# Patient Record
Sex: FEMALE | Race: AMERICAN INDIAN OR ALASKA NATIVE | HISPANIC OR LATINO
[De-identification: names, ages, dates, MRNs, and addresses within clinical notes are randomized per-mention and may not be internally consistent; named-entity substitution may affect disease eponyms.]

---

## 2017-06-13 ENCOUNTER — APPOINTMENT (OUTPATIENT)
Dept: INFECTIOUS DISEASE | Facility: CLINIC | Age: 42
End: 2017-06-13

## 2017-06-13 ENCOUNTER — LABORATORY RESULT (OUTPATIENT)
Age: 42
End: 2017-06-13

## 2017-06-13 ENCOUNTER — OUTPATIENT (OUTPATIENT)
Dept: OUTPATIENT SERVICES | Facility: HOSPITAL | Age: 42
LOS: 1 days | End: 2017-06-13
Payer: COMMERCIAL

## 2017-06-13 VITALS
BODY MASS INDEX: 23.39 KG/M2 | WEIGHT: 137 LBS | HEIGHT: 64 IN | RESPIRATION RATE: 16 BRPM | DIASTOLIC BLOOD PRESSURE: 68 MMHG | OXYGEN SATURATION: 100 % | HEART RATE: 82 BPM | SYSTOLIC BLOOD PRESSURE: 96 MMHG | TEMPERATURE: 97.3 F

## 2017-06-13 DIAGNOSIS — B20 HUMAN IMMUNODEFICIENCY VIRUS [HIV] DISEASE: ICD-10-CM

## 2017-06-13 PROCEDURE — 93010 ELECTROCARDIOGRAM REPORT: CPT | Mod: NC

## 2017-06-13 PROCEDURE — G0463: CPT

## 2017-06-13 PROCEDURE — 93005 ELECTROCARDIOGRAM TRACING: CPT

## 2017-06-14 LAB
ALBUMIN SERPL ELPH-MCNC: 4.1 G/DL
ALP BLD-CCNC: 65 U/L
ALT SERPL-CCNC: 6 U/L
ANION GAP SERPL CALC-SCNC: 9 MMOL/L
APPEARANCE: CLEAR
AST SERPL-CCNC: 16 U/L
BACTERIA: NEGATIVE
BASOPHILS # BLD AUTO: 0.02 K/UL
BASOPHILS NFR BLD AUTO: 0.3 %
BILIRUB SERPL-MCNC: 0.3 MG/DL
BILIRUBIN URINE: NEGATIVE
BLOOD URINE: NEGATIVE
BUN SERPL-MCNC: 11 MG/DL
C TRACH RRNA SPEC QL NAA+PROBE: NORMAL
CALCIUM SERPL-MCNC: 9.4 MG/DL
CD3 CELLS # BLD: 1157 /UL
CD3 CELLS NFR BLD: 73 %
CD3+CD4+ CELLS # BLD: 616 /UL
CD3+CD4+ CELLS NFR BLD: 39 %
CD3+CD4+ CELLS/CD3+CD8+ CLL SPEC: 1.15 RATIO
CD3+CD8+ CELLS # SPEC: 534 /UL
CD3+CD8+ CELLS NFR BLD: 34 %
CHLORIDE SERPL-SCNC: 105 MMOL/L
CO2 SERPL-SCNC: 24 MMOL/L
COLOR: YELLOW
CREAT SERPL-MCNC: 0.71 MG/DL
EOSINOPHIL # BLD AUTO: 0.09 K/UL
EOSINOPHIL NFR BLD AUTO: 1.5 %
GLUCOSE QUALITATIVE U: NORMAL MG/DL
GLUCOSE SERPL-MCNC: 90 MG/DL
HAV IGG+IGM SER QL: REACTIVE
HBV CORE IGG+IGM SER QL: NONREACTIVE
HBV SURFACE AB SER QL: REACTIVE
HBV SURFACE AG SER QL: NONREACTIVE
HCG SERPL-MCNC: <1 MIU/ML
HCT VFR BLD CALC: 38 %
HGB BLD-MCNC: 12.3 G/DL
HIV1 RNA # SERPL NAA+PROBE: NORMAL
HIV1 RNA # SERPL NAA+PROBE: NOT DETECTED COPIES/ML
HIV1+2 AB SPEC QL IA.RAPID: REACTIVE
HYALINE CASTS: 1 /LPF
IMM GRANULOCYTES NFR BLD AUTO: 0 %
KETONES URINE: NEGATIVE
LEUKOCYTE ESTERASE URINE: NEGATIVE
LYMPHOCYTES # BLD AUTO: 1.49 K/UL
LYMPHOCYTES NFR BLD AUTO: 25.6 %
MAN DIFF?: NORMAL
MCHC RBC-ENTMCNC: 31.5 PG
MCHC RBC-ENTMCNC: 32.4 GM/DL
MCV RBC AUTO: 97.2 FL
MICROSCOPIC-UA: NORMAL
MONOCYTES # BLD AUTO: 0.36 K/UL
MONOCYTES NFR BLD AUTO: 6.2 %
N GONORRHOEA RRNA SPEC QL NAA+PROBE: NORMAL
NEUTROPHILS # BLD AUTO: 3.85 K/UL
NEUTROPHILS NFR BLD AUTO: 66.4 %
NITRITE URINE: NEGATIVE
PH URINE: 6
PLATELET # BLD AUTO: 331 K/UL
POTASSIUM SERPL-SCNC: 4.5 MMOL/L
PROT SERPL-MCNC: 8.2 G/DL
PROTEIN URINE: NEGATIVE MG/DL
RBC # BLD: 3.91 M/UL
RBC # FLD: 13.8 %
RED BLOOD CELLS URINE: 0 /HPF
SODIUM SERPL-SCNC: 138 MMOL/L
SOURCE AMPLIFICATION: NORMAL
SPECIFIC GRAVITY URINE: 1.02
SQUAMOUS EPITHELIAL CELLS: 1 /HPF
T PALLIDUM AB SER QL IA: NEGATIVE
UROBILINOGEN URINE: NORMAL MG/DL
VIRAL LOAD INTERP: NORMAL
VIRAL LOAD LOG: NOT DETECTED LG COP/ML
WBC # FLD AUTO: 5.81 K/UL
WHITE BLOOD CELLS URINE: 1 /HPF

## 2017-06-15 DIAGNOSIS — A60.00 HERPESVIRAL INFECTION OF UROGENITAL SYSTEM, UNSPECIFIED: ICD-10-CM

## 2017-06-15 LAB
HCV RNA SERPL NAA DL=5-ACNC: NOT DETECTED IU/ML
HCV RNA SERPL NAA+PROBE-LOG IU: NOT DETECTED LOGIU/ML

## 2017-06-16 LAB
ADJUSTED MITOGEN: 3.33 IU/ML
ADJUSTED TB AG: 1.16 IU/ML
M TB IFN-G BLD-IMP: ABNORMAL
QUANTIFERON GOLD NIL: 8.35 IU/ML

## 2017-07-13 ENCOUNTER — EMERGENCY (EMERGENCY)
Facility: HOSPITAL | Age: 42
LOS: 1 days | Discharge: HOME | End: 2017-07-13
Attending: EMERGENCY MEDICINE | Admitting: EMERGENCY MEDICINE
Payer: COMMERCIAL

## 2017-07-13 VITALS
HEART RATE: 70 BPM | TEMPERATURE: 98 F | OXYGEN SATURATION: 100 % | RESPIRATION RATE: 19 BRPM | SYSTOLIC BLOOD PRESSURE: 91 MMHG | DIASTOLIC BLOOD PRESSURE: 59 MMHG

## 2017-07-13 VITALS
DIASTOLIC BLOOD PRESSURE: 72 MMHG | TEMPERATURE: 98 F | OXYGEN SATURATION: 98 % | RESPIRATION RATE: 19 BRPM | HEIGHT: 64 IN | WEIGHT: 136.69 LBS | SYSTOLIC BLOOD PRESSURE: 104 MMHG | HEART RATE: 71 BPM

## 2017-07-13 DIAGNOSIS — M54.12 RADICULOPATHY, CERVICAL REGION: ICD-10-CM

## 2017-07-13 DIAGNOSIS — Z91.013 ALLERGY TO SEAFOOD: ICD-10-CM

## 2017-07-13 PROCEDURE — 72125 CT NECK SPINE W/O DYE: CPT

## 2017-07-13 PROCEDURE — 99284 EMERGENCY DEPT VISIT MOD MDM: CPT

## 2017-07-13 PROCEDURE — 72125 CT NECK SPINE W/O DYE: CPT | Mod: 26

## 2017-07-13 RX ORDER — CELECOXIB 200 MG/1
1 CAPSULE ORAL
Qty: 10 | Refills: 0 | OUTPATIENT
Start: 2017-07-13 | End: 2017-07-23

## 2017-07-13 NOTE — ED PROVIDER NOTE - OBJECTIVE STATEMENT
41 yo female with left sided neck pain radiating into left arm for the past few days. No chest pain and no paresthesia. No weakness in extremities.

## 2017-07-13 NOTE — ED PROVIDER NOTE - CONSTITUTIONAL, MLM
normal... Well appearing, Female, well nourished, awake, alert, oriented to person, place, time/situation and in no apparent distress.

## 2017-07-13 NOTE — ED ADULT NURSE NOTE - OBJECTIVE STATEMENT
Present to ER with a complaints of generalized back pain that radiates to left arm. Pt states she has tingling feeling going down her left arm. denies any chest pain of shortness of breath

## 2017-07-18 ENCOUNTER — OUTPATIENT (OUTPATIENT)
Dept: OUTPATIENT SERVICES | Facility: HOSPITAL | Age: 42
LOS: 1 days | End: 2017-07-18
Payer: COMMERCIAL

## 2017-07-18 ENCOUNTER — APPOINTMENT (OUTPATIENT)
Dept: INFECTIOUS DISEASE | Facility: CLINIC | Age: 42
End: 2017-07-18

## 2017-07-18 VITALS
DIASTOLIC BLOOD PRESSURE: 67 MMHG | TEMPERATURE: 97.4 F | HEIGHT: 64 IN | BODY MASS INDEX: 23.39 KG/M2 | OXYGEN SATURATION: 100 % | RESPIRATION RATE: 16 BRPM | HEART RATE: 86 BPM | SYSTOLIC BLOOD PRESSURE: 93 MMHG | WEIGHT: 137 LBS

## 2017-07-18 DIAGNOSIS — J30.2 OTHER SEASONAL ALLERGIC RHINITIS: ICD-10-CM

## 2017-07-18 DIAGNOSIS — M50.20 OTHER CERVICAL DISC DISPLACEMENT, UNSPECIFIED CERVICAL REGION: ICD-10-CM

## 2017-07-18 DIAGNOSIS — B20 HUMAN IMMUNODEFICIENCY VIRUS [HIV] DISEASE: ICD-10-CM

## 2017-07-18 PROCEDURE — G0463: CPT

## 2017-07-26 ENCOUNTER — OUTPATIENT (OUTPATIENT)
Dept: OUTPATIENT SERVICES | Facility: HOSPITAL | Age: 42
LOS: 1 days | End: 2017-07-26
Payer: COMMERCIAL

## 2017-07-26 ENCOUNTER — APPOINTMENT (OUTPATIENT)
Dept: INFECTIOUS DISEASE | Facility: CLINIC | Age: 42
End: 2017-07-26

## 2017-07-26 VITALS
OXYGEN SATURATION: 100 % | HEART RATE: 88 BPM | TEMPERATURE: 98.5 F | SYSTOLIC BLOOD PRESSURE: 93 MMHG | WEIGHT: 137 LBS | HEIGHT: 64 IN | BODY MASS INDEX: 23.39 KG/M2 | DIASTOLIC BLOOD PRESSURE: 68 MMHG | RESPIRATION RATE: 16 BRPM

## 2017-07-26 DIAGNOSIS — B20 HUMAN IMMUNODEFICIENCY VIRUS [HIV] DISEASE: ICD-10-CM

## 2017-07-26 DIAGNOSIS — R51 HEADACHE: ICD-10-CM

## 2017-07-26 PROCEDURE — G0463: CPT

## 2017-07-27 DIAGNOSIS — J02.9 ACUTE PHARYNGITIS, UNSPECIFIED: ICD-10-CM

## 2017-07-31 LAB
ALBUMIN SERPL ELPH-MCNC: 4 G/DL
ALP BLD-CCNC: 62 U/L
ALT SERPL-CCNC: 5 U/L
ANION GAP SERPL CALC-SCNC: 14 MMOL/L
AST SERPL-CCNC: 13 U/L
BACTERIA THROAT CULT: NORMAL
BASOPHILS # BLD AUTO: 0.03 K/UL
BASOPHILS NFR BLD AUTO: 0.3 %
BILIRUB SERPL-MCNC: 0.3 MG/DL
BUN SERPL-MCNC: 13 MG/DL
CALCIUM SERPL-MCNC: 9.4 MG/DL
CHLORIDE SERPL-SCNC: 103 MMOL/L
CO2 SERPL-SCNC: 24 MMOL/L
CREAT SERPL-MCNC: 0.77 MG/DL
EOSINOPHIL # BLD AUTO: 0.04 K/UL
EOSINOPHIL NFR BLD AUTO: 0.4 %
GLUCOSE SERPL-MCNC: 121 MG/DL
HCG SERPL-MCNC: 1 MIU/ML
HCT VFR BLD CALC: 35.8 %
HGB BLD-MCNC: 11.8 G/DL
IMM GRANULOCYTES NFR BLD AUTO: 0.2 %
LYMPHOCYTES # BLD AUTO: 1.02 K/UL
LYMPHOCYTES NFR BLD AUTO: 9.1 %
MAN DIFF?: NORMAL
MCHC RBC-ENTMCNC: 31.1 PG
MCHC RBC-ENTMCNC: 33 GM/DL
MCV RBC AUTO: 94.5 FL
MONOCYTES # BLD AUTO: 0.61 K/UL
MONOCYTES NFR BLD AUTO: 5.5 %
NEUTROPHILS # BLD AUTO: 9.44 K/UL
NEUTROPHILS NFR BLD AUTO: 84.5 %
PLATELET # BLD AUTO: 288 K/UL
POTASSIUM SERPL-SCNC: 4.2 MMOL/L
PROT SERPL-MCNC: 7.1 G/DL
RBC # BLD: 3.79 M/UL
RBC # FLD: 12.9 %
SODIUM SERPL-SCNC: 141 MMOL/L
WBC # FLD AUTO: 11.16 K/UL

## 2017-08-14 ENCOUNTER — OUTPATIENT (OUTPATIENT)
Dept: OUTPATIENT SERVICES | Facility: HOSPITAL | Age: 42
LOS: 1 days | End: 2017-08-14
Payer: COMMERCIAL

## 2017-08-14 ENCOUNTER — APPOINTMENT (OUTPATIENT)
Dept: MAMMOGRAPHY | Facility: CLINIC | Age: 42
End: 2017-08-14
Payer: COMMERCIAL

## 2017-08-14 DIAGNOSIS — Z00.8 ENCOUNTER FOR OTHER GENERAL EXAMINATION: ICD-10-CM

## 2017-08-14 PROCEDURE — 77063 BREAST TOMOSYNTHESIS BI: CPT | Mod: 26

## 2017-08-14 PROCEDURE — G0202: CPT | Mod: 26

## 2017-08-14 PROCEDURE — 77063 BREAST TOMOSYNTHESIS BI: CPT

## 2017-08-14 PROCEDURE — 77067 SCR MAMMO BI INCL CAD: CPT

## 2017-08-29 ENCOUNTER — APPOINTMENT (OUTPATIENT)
Dept: GASTROENTEROLOGY | Facility: CLINIC | Age: 42
End: 2017-08-29

## 2017-09-22 ENCOUNTER — MEDICATION RENEWAL (OUTPATIENT)
Age: 42
End: 2017-09-22

## 2017-11-03 ENCOUNTER — OUTPATIENT (OUTPATIENT)
Dept: OUTPATIENT SERVICES | Facility: HOSPITAL | Age: 42
LOS: 1 days | End: 2017-11-03
Payer: COMMERCIAL

## 2017-11-03 ENCOUNTER — RESULT REVIEW (OUTPATIENT)
Age: 42
End: 2017-11-03

## 2017-11-03 ENCOUNTER — APPOINTMENT (OUTPATIENT)
Dept: INFECTIOUS DISEASE | Facility: CLINIC | Age: 42
End: 2017-11-03

## 2017-11-03 DIAGNOSIS — B20 HUMAN IMMUNODEFICIENCY VIRUS [HIV] DISEASE: ICD-10-CM

## 2017-11-03 PROCEDURE — 87624 HPV HI-RISK TYP POOLED RSLT: CPT

## 2017-11-03 PROCEDURE — G0463: CPT

## 2017-11-04 LAB
C TRACH RRNA SPEC QL NAA+PROBE: SIGNIFICANT CHANGE UP
HPV HIGH+LOW RISK DNA PNL CVX: SIGNIFICANT CHANGE UP
N GONORRHOEA RRNA SPEC QL NAA+PROBE: SIGNIFICANT CHANGE UP
SPECIMEN SOURCE: SIGNIFICANT CHANGE UP

## 2017-11-07 LAB — CYTOLOGY SPEC DOC CYTO: SIGNIFICANT CHANGE UP

## 2017-11-09 DIAGNOSIS — N91.2 AMENORRHEA, UNSPECIFIED: ICD-10-CM

## 2017-11-09 DIAGNOSIS — N94.9 UNSPECIFIED CONDITION ASSOCIATED WITH FEMALE GENITAL ORGANS AND MENSTRUAL CYCLE: ICD-10-CM

## 2017-11-09 DIAGNOSIS — N60.19 DIFFUSE CYSTIC MASTOPATHY OF UNSPECIFIED BREAST: ICD-10-CM

## 2017-11-14 ENCOUNTER — OUTPATIENT (OUTPATIENT)
Dept: OUTPATIENT SERVICES | Facility: HOSPITAL | Age: 42
LOS: 1 days | End: 2017-11-14
Payer: COMMERCIAL

## 2017-11-14 ENCOUNTER — APPOINTMENT (OUTPATIENT)
Dept: INFECTIOUS DISEASE | Facility: CLINIC | Age: 42
End: 2017-11-14

## 2017-11-14 ENCOUNTER — LABORATORY RESULT (OUTPATIENT)
Age: 42
End: 2017-11-14

## 2017-11-14 VITALS
HEIGHT: 64 IN | OXYGEN SATURATION: 99 % | HEART RATE: 86 BPM | WEIGHT: 140 LBS | DIASTOLIC BLOOD PRESSURE: 69 MMHG | TEMPERATURE: 97.3 F | BODY MASS INDEX: 23.9 KG/M2 | SYSTOLIC BLOOD PRESSURE: 95 MMHG

## 2017-11-14 DIAGNOSIS — B20 HUMAN IMMUNODEFICIENCY VIRUS [HIV] DISEASE: ICD-10-CM

## 2017-11-14 DIAGNOSIS — Z81.8 FAMILY HISTORY OF OTHER MENTAL AND BEHAVIORAL DISORDERS: ICD-10-CM

## 2017-11-14 DIAGNOSIS — Z86.19 PERSONAL HISTORY OF OTHER INFECTIOUS AND PARASITIC DISEASES: ICD-10-CM

## 2017-11-14 DIAGNOSIS — Z82.62 FAMILY HISTORY OF OSTEOPOROSIS: ICD-10-CM

## 2017-11-14 DIAGNOSIS — Z23 ENCOUNTER FOR IMMUNIZATION: ICD-10-CM

## 2017-11-14 DIAGNOSIS — Z82.0 FAMILY HISTORY OF EPILEPSY AND OTHER DISEASES OF THE NERVOUS SYSTEM: ICD-10-CM

## 2017-11-14 DIAGNOSIS — Z83.49 FAMILY HISTORY OF OTHER ENDOCRINE, NUTRITIONAL AND METABOLIC DISEASES: ICD-10-CM

## 2017-11-14 PROCEDURE — G0008: CPT

## 2017-11-14 PROCEDURE — G0463: CPT | Mod: 25

## 2017-11-14 PROCEDURE — 90686 IIV4 VACC NO PRSV 0.5 ML IM: CPT

## 2017-11-20 ENCOUNTER — APPOINTMENT (OUTPATIENT)
Dept: UROLOGY | Facility: HOSPITAL | Age: 42
End: 2017-11-20

## 2017-11-20 ENCOUNTER — OUTPATIENT (OUTPATIENT)
Dept: OUTPATIENT SERVICES | Facility: HOSPITAL | Age: 42
LOS: 1 days | End: 2017-11-20
Payer: COMMERCIAL

## 2017-11-20 VITALS
HEART RATE: 81 BPM | DIASTOLIC BLOOD PRESSURE: 71 MMHG | RESPIRATION RATE: 14 BRPM | HEIGHT: 64 IN | BODY MASS INDEX: 23.73 KG/M2 | WEIGHT: 139 LBS | SYSTOLIC BLOOD PRESSURE: 96 MMHG

## 2017-11-20 DIAGNOSIS — N39.3 STRESS INCONTINENCE (FEMALE) (MALE): ICD-10-CM

## 2017-11-20 DIAGNOSIS — R30.0 DYSURIA: ICD-10-CM

## 2017-11-20 PROCEDURE — G0463: CPT

## 2017-11-20 RX ORDER — IBUPROFEN 400 MG/1
400 TABLET, FILM COATED ORAL
Qty: 42 | Refills: 0 | Status: DISCONTINUED | COMMUNITY
Start: 2017-07-20 | End: 2017-11-20

## 2017-11-20 RX ORDER — AMOXICILLIN AND CLAVULANATE POTASSIUM 875; 125 MG/1; MG/1
875-125 TABLET, COATED ORAL
Qty: 20 | Refills: 0 | Status: DISCONTINUED | COMMUNITY
Start: 2017-07-26 | End: 2017-11-20

## 2017-11-21 LAB
25(OH)D3 SERPL-MCNC: 22.9 NG/ML
ADJUSTED MITOGEN: 8.29 IU/ML
ADJUSTED TB AG: 0.49 IU/ML
ALBUMIN SERPL ELPH-MCNC: 4.1 G/DL
ALP BLD-CCNC: 61 U/L
ALT SERPL-CCNC: 5 U/L
ANION GAP SERPL CALC-SCNC: 16 MMOL/L
APPEARANCE: CLEAR
AST SERPL-CCNC: 14 U/L
BASOPHILS # BLD AUTO: 0.03 K/UL
BASOPHILS NFR BLD AUTO: 0.4 %
BILIRUB SERPL-MCNC: 0.2 MG/DL
BILIRUBIN URINE: NEGATIVE
BLOOD URINE: ABNORMAL
BUN SERPL-MCNC: 18 MG/DL
CALCIUM SERPL-MCNC: 9.3 MG/DL
CD3 CELLS # BLD: 1087 /UL
CD3 CELLS NFR BLD: 67 %
CD3+CD4+ CELLS # BLD: 563 /UL
CD3+CD4+ CELLS NFR BLD: 35 %
CD3+CD4+ CELLS/CD3+CD8+ CLL SPEC: 1.09 RATIO
CD3+CD8+ CELLS # SPEC: 514 /UL
CD3+CD8+ CELLS NFR BLD: 32 %
CHLORIDE SERPL-SCNC: 105 MMOL/L
CHOLEST SERPL-MCNC: 172 MG/DL
CHOLEST/HDLC SERPL: 2.5 RATIO
CO2 SERPL-SCNC: 22 MMOL/L
COLOR: YELLOW
CREAT SERPL-MCNC: 0.74 MG/DL
EOSINOPHIL # BLD AUTO: 0.1 K/UL
EOSINOPHIL NFR BLD AUTO: 1.3
GLUCOSE QUALITATIVE U: NEGATIVE MG/DL
GLUCOSE SERPL-MCNC: 85 MG/DL
HBA1C MFR BLD HPLC: 5 %
HCT VFR BLD CALC: 39.8 %
HDLC SERPL-MCNC: 68 MG/DL
HGB BLD-MCNC: 13.1 G/DL
HIV1 RNA # SERPL NAA+PROBE: NORMAL
HIV1 RNA # SERPL NAA+PROBE: NORMAL COPIES/ML
IMM GRANULOCYTES NFR BLD AUTO: 0.1 %
KETONES URINE: NEGATIVE
LDLC SERPL CALC-MCNC: 81 MG/DL
LEUKOCYTE ESTERASE URINE: NEGATIVE
LYMPHOCYTES # BLD AUTO: 1.51 K/UL
LYMPHOCYTES NFR BLD AUTO: 20 %
M TB IFN-G BLD-IMP: NEGATIVE
MAN DIFF?: NORMAL
MCHC RBC-ENTMCNC: 31.6 PG
MCHC RBC-ENTMCNC: 32.9 GM/DL
MCV RBC AUTO: 96.1 FL
MONOCYTES # BLD AUTO: 0.63 K/UL
MONOCYTES NFR BLD AUTO: 8.4 %
NEUTROPHILS # BLD AUTO: 5.26 K/UL
NEUTROPHILS NFR BLD AUTO: 69.8 %
NITRITE URINE: NEGATIVE
PH URINE: 8
PLATELET # BLD AUTO: 310 K/UL
POTASSIUM SERPL-SCNC: 4 MMOL/L
PROT SERPL-MCNC: 7.8 G/DL
PROTEIN URINE: NEGATIVE MG/DL
QUANTIFERON GOLD NIL: 4.53 IU/ML
RBC # BLD: 4.14 M/UL
RBC # FLD: 13.5 %
SODIUM SERPL-SCNC: 143 MMOL/L
SPECIFIC GRAVITY URINE: 1.02
TRIGL SERPL-MCNC: 116 MG/DL
TSH SERPL-ACNC: 1.7 UIU/ML
UROBILINOGEN URINE: NEGATIVE MG/DL
VIRAL LOAD INTERP: NORMAL
VIRAL LOAD LOG: NORMAL LG COP/ML
WBC # FLD AUTO: 7.54 K/UL

## 2017-11-22 LAB
APPEARANCE: ABNORMAL
BACTERIA UR CULT: NORMAL
BACTERIA: NEGATIVE
BILIRUBIN URINE: NEGATIVE
BLOOD URINE: NEGATIVE
COLOR: YELLOW
GLUCOSE QUALITATIVE U: NEGATIVE MG/DL
KETONES URINE: NEGATIVE
LEUKOCYTE ESTERASE URINE: NEGATIVE
MICROSCOPIC-UA: NORMAL
NITRITE URINE: NEGATIVE
PH URINE: 5
PROTEIN URINE: NEGATIVE MG/DL
RED BLOOD CELLS URINE: 0 /HPF
SPECIFIC GRAVITY URINE: 1.02
SQUAMOUS EPITHELIAL CELLS: 0 /HPF
UROBILINOGEN URINE: NEGATIVE MG/DL
WHITE BLOOD CELLS URINE: 0 /HPF

## 2018-01-22 ENCOUNTER — APPOINTMENT (OUTPATIENT)
Dept: UROLOGY | Facility: HOSPITAL | Age: 43
End: 2018-01-22

## 2018-01-22 ENCOUNTER — OUTPATIENT (OUTPATIENT)
Dept: OUTPATIENT SERVICES | Facility: HOSPITAL | Age: 43
LOS: 1 days | End: 2018-01-22
Payer: MEDICAID

## 2018-01-22 VITALS
BODY MASS INDEX: 24.24 KG/M2 | DIASTOLIC BLOOD PRESSURE: 59 MMHG | WEIGHT: 142 LBS | SYSTOLIC BLOOD PRESSURE: 90 MMHG | HEIGHT: 64 IN | HEART RATE: 85 BPM | RESPIRATION RATE: 14 BRPM

## 2018-01-22 DIAGNOSIS — N39.3 STRESS INCONTINENCE (FEMALE) (MALE): ICD-10-CM

## 2018-01-22 DIAGNOSIS — R30.0 DYSURIA: ICD-10-CM

## 2018-01-22 DIAGNOSIS — Z00.00 ENCOUNTER FOR GENERAL ADULT MEDICAL EXAMINATION WITHOUT ABNORMAL FINDINGS: ICD-10-CM

## 2018-01-22 PROCEDURE — G0463: CPT

## 2018-01-22 PROCEDURE — 81025 URINE PREGNANCY TEST: CPT

## 2018-01-23 LAB — HCG UR QL: NEGATIVE — SIGNIFICANT CHANGE UP

## 2018-01-24 ENCOUNTER — RX RENEWAL (OUTPATIENT)
Age: 43
End: 2018-01-24

## 2018-01-25 ENCOUNTER — RX RENEWAL (OUTPATIENT)
Age: 43
End: 2018-01-25

## 2018-01-29 ENCOUNTER — APPOINTMENT (OUTPATIENT)
Dept: INFECTIOUS DISEASE | Facility: CLINIC | Age: 43
End: 2018-01-29

## 2018-01-29 ENCOUNTER — APPOINTMENT (OUTPATIENT)
Dept: INFECTIOUS DISEASE | Facility: CLINIC | Age: 43
End: 2018-01-29
Payer: MEDICAID

## 2018-01-29 VITALS
DIASTOLIC BLOOD PRESSURE: 68 MMHG | HEART RATE: 74 BPM | HEIGHT: 64 IN | SYSTOLIC BLOOD PRESSURE: 100 MMHG | WEIGHT: 140 LBS | BODY MASS INDEX: 23.9 KG/M2 | TEMPERATURE: 98.2 F | OXYGEN SATURATION: 100 %

## 2018-01-29 DIAGNOSIS — N92.6 IRREGULAR MENSTRUATION, UNSPECIFIED: ICD-10-CM

## 2018-01-29 DIAGNOSIS — J30.2 OTHER SEASONAL ALLERGIC RHINITIS: ICD-10-CM

## 2018-01-29 LAB
APPEARANCE: CLEAR
BACTERIA: NEGATIVE
BASOPHILS # BLD AUTO: 0.02 K/UL
BASOPHILS NFR BLD AUTO: 0.4 %
BILIRUBIN URINE: NEGATIVE
BLOOD URINE: NEGATIVE
COLOR: YELLOW
EOSINOPHIL # BLD AUTO: 0.06 K/UL
EOSINOPHIL NFR BLD AUTO: 1.1 %
GLUCOSE QUALITATIVE U: NEGATIVE MG/DL
HCT VFR BLD CALC: 39.5 %
HGB BLD-MCNC: 12.8 G/DL
HYALINE CASTS: 6 /LPF
IMM GRANULOCYTES NFR BLD AUTO: 0.2 %
KETONES URINE: NEGATIVE
LEUKOCYTE ESTERASE URINE: NEGATIVE
LYMPHOCYTES # BLD AUTO: 1.36 K/UL
LYMPHOCYTES NFR BLD AUTO: 25.8 %
MAN DIFF?: NORMAL
MCHC RBC-ENTMCNC: 30.8 PG
MCHC RBC-ENTMCNC: 32.4 GM/DL
MCV RBC AUTO: 95.2 FL
MICROSCOPIC-UA: NORMAL
MONOCYTES # BLD AUTO: 0.31 K/UL
MONOCYTES NFR BLD AUTO: 5.9 %
NEUTROPHILS # BLD AUTO: 3.51 K/UL
NEUTROPHILS NFR BLD AUTO: 66.6 %
NITRITE URINE: NEGATIVE
PH URINE: 7
PLATELET # BLD AUTO: 291 K/UL
PROTEIN URINE: NEGATIVE MG/DL
RBC # BLD: 4.15 M/UL
RBC # FLD: 14.3 %
RED BLOOD CELLS URINE: 15 /HPF
SPECIFIC GRAVITY URINE: 1.02
SQUAMOUS EPITHELIAL CELLS: 3 /HPF
UROBILINOGEN URINE: NEGATIVE MG/DL
WBC # FLD AUTO: 5.27 K/UL
WHITE BLOOD CELLS URINE: 1 /HPF

## 2018-01-29 PROCEDURE — 99214 OFFICE O/P EST MOD 30 MIN: CPT

## 2018-01-30 LAB
25(OH)D3 SERPL-MCNC: 20.8 NG/ML
ALBUMIN SERPL ELPH-MCNC: 4 G/DL
ALP BLD-CCNC: 67 U/L
ALT SERPL-CCNC: 13 U/L
ANION GAP SERPL CALC-SCNC: 12 MMOL/L
AST SERPL-CCNC: 17 U/L
BILIRUB SERPL-MCNC: 0.3 MG/DL
BUN SERPL-MCNC: 13 MG/DL
C TRACH RRNA SPEC QL NAA+PROBE: NOT DETECTED
CALCIUM SERPL-MCNC: 9.2 MG/DL
CD3 CELLS # BLD: 810 /UL
CD3 CELLS NFR BLD: 70 %
CD3+CD4+ CELLS # BLD: 406 /UL
CD3+CD4+ CELLS NFR BLD: 35 %
CD3+CD4+ CELLS/CD3+CD8+ CLL SPEC: 1 RATIO
CD3+CD8+ CELLS # SPEC: 405 /UL
CD3+CD8+ CELLS NFR BLD: 35 %
CHLORIDE SERPL-SCNC: 105 MMOL/L
CO2 SERPL-SCNC: 23 MMOL/L
CREAT SERPL-MCNC: 0.71 MG/DL
GLUCOSE SERPL-MCNC: 87 MG/DL
HBA1C MFR BLD HPLC: 4.9 %
HBV SURFACE AG SER QL: NONREACTIVE
HCG SERPL-MCNC: <1 MIU/ML
HIV1 RNA # SERPL NAA+PROBE: NORMAL
HIV1 RNA # SERPL NAA+PROBE: NORMAL COPIES/ML
N GONORRHOEA RRNA SPEC QL NAA+PROBE: NOT DETECTED
POTASSIUM SERPL-SCNC: 4.3 MMOL/L
PROT SERPL-MCNC: 7.2 G/DL
SODIUM SERPL-SCNC: 140 MMOL/L
SOURCE AMPLIFICATION: NORMAL
T PALLIDUM AB SER QL IA: NEGATIVE
VIRAL LOAD INTERP: NORMAL
VIRAL LOAD LOG: NORMAL LG COP/ML

## 2018-01-31 LAB
ADJUSTED MITOGEN: 9.77 IU/ML
ADJUSTED TB AG: 0.28 IU/ML
HCV RNA SERPL NAA DL=5-ACNC: NOT DETECTED IU/ML
HCV RNA SERPL NAA+PROBE-LOG IU: NOT DETECTED LOGIU/ML
M TB IFN-G BLD-IMP: NEGATIVE
QUANTIFERON GOLD NIL: 1.77 IU/ML

## 2018-02-23 ENCOUNTER — RX RENEWAL (OUTPATIENT)
Age: 43
End: 2018-02-23

## 2018-03-02 ENCOUNTER — RX RENEWAL (OUTPATIENT)
Age: 43
End: 2018-03-02

## 2018-03-26 ENCOUNTER — RX RENEWAL (OUTPATIENT)
Age: 43
End: 2018-03-26

## 2018-04-06 ENCOUNTER — APPOINTMENT (OUTPATIENT)
Dept: INFECTIOUS DISEASE | Facility: CLINIC | Age: 43
End: 2018-04-06

## 2018-04-19 ENCOUNTER — RX RENEWAL (OUTPATIENT)
Age: 43
End: 2018-04-19

## 2018-05-09 ENCOUNTER — APPOINTMENT (OUTPATIENT)
Dept: INFECTIOUS DISEASE | Facility: CLINIC | Age: 43
End: 2018-05-09
Payer: MEDICAID

## 2018-05-09 VITALS
TEMPERATURE: 98.4 F | HEART RATE: 82 BPM | BODY MASS INDEX: 23.9 KG/M2 | WEIGHT: 140 LBS | HEIGHT: 64 IN | SYSTOLIC BLOOD PRESSURE: 95 MMHG | DIASTOLIC BLOOD PRESSURE: 63 MMHG | OXYGEN SATURATION: 98 %

## 2018-05-09 LAB
BASOPHILS # BLD AUTO: 0.04 K/UL
BASOPHILS NFR BLD AUTO: 0.6 %
EOSINOPHIL # BLD AUTO: 0.12 K/UL
EOSINOPHIL NFR BLD AUTO: 1.8 %
HCT VFR BLD CALC: 38.7 %
HGB BLD-MCNC: 12.6 G/DL
IMM GRANULOCYTES NFR BLD AUTO: 0.3 %
LYMPHOCYTES # BLD AUTO: 1.51 K/UL
LYMPHOCYTES NFR BLD AUTO: 22.2 %
MAN DIFF?: NORMAL
MCHC RBC-ENTMCNC: 31.3 PG
MCHC RBC-ENTMCNC: 32.6 GM/DL
MCV RBC AUTO: 96 FL
MONOCYTES # BLD AUTO: 0.48 K/UL
MONOCYTES NFR BLD AUTO: 7.1 %
NEUTROPHILS # BLD AUTO: 4.62 K/UL
NEUTROPHILS NFR BLD AUTO: 68 %
PLATELET # BLD AUTO: 334 K/UL
RBC # BLD: 4.03 M/UL
RBC # FLD: 13.7 %
WBC # FLD AUTO: 6.79 K/UL

## 2018-05-09 PROCEDURE — 99214 OFFICE O/P EST MOD 30 MIN: CPT

## 2018-05-14 LAB
25(OH)D3 SERPL-MCNC: 22.1 NG/ML
ALBUMIN SERPL ELPH-MCNC: 4 G/DL
ALP BLD-CCNC: 64 U/L
ALT SERPL-CCNC: 13 U/L
ANION GAP SERPL CALC-SCNC: 11 MMOL/L
APPEARANCE: CLEAR
AST SERPL-CCNC: 23 U/L
BACTERIA: NEGATIVE
BILIRUB SERPL-MCNC: 0.2 MG/DL
BILIRUBIN URINE: NEGATIVE
BLOOD URINE: NEGATIVE
BUN SERPL-MCNC: 17 MG/DL
CALCIUM SERPL-MCNC: 9.7 MG/DL
CD3 CELLS # BLD: 1125 /UL
CD3 CELLS NFR BLD: 72 %
CD3+CD4+ CELLS # BLD: 578 /UL
CD3+CD4+ CELLS NFR BLD: 37 %
CD3+CD4+ CELLS/CD3+CD8+ CLL SPEC: 1.05 RATIO
CD3+CD8+ CELLS # SPEC: 549 /UL
CD3+CD8+ CELLS NFR BLD: 35 %
CHLORIDE SERPL-SCNC: 105 MMOL/L
CO2 SERPL-SCNC: 25 MMOL/L
COLOR: YELLOW
CREAT SERPL-MCNC: 0.78 MG/DL
GLUCOSE QUALITATIVE U: NEGATIVE MG/DL
GLUCOSE SERPL-MCNC: 70 MG/DL
HBA1C MFR BLD HPLC: 5.2 %
HCV RNA SERPL NAA DL=5-ACNC: NOT DETECTED IU/ML
HCV RNA SERPL NAA+PROBE-LOG IU: NOT DETECTED LOGIU/ML
HIV1 RNA # SERPL NAA+PROBE: NORMAL
HIV1 RNA # SERPL NAA+PROBE: NORMAL COPIES/ML
KETONES URINE: NEGATIVE
LEUKOCYTE ESTERASE URINE: NEGATIVE
MICROSCOPIC-UA: NORMAL
NITRITE URINE: NEGATIVE
PH URINE: 5.5
POTASSIUM SERPL-SCNC: 4.5 MMOL/L
PROT SERPL-MCNC: 7.8 G/DL
PROTEIN URINE: NEGATIVE MG/DL
RED BLOOD CELLS URINE: 5 /HPF
SODIUM SERPL-SCNC: 141 MMOL/L
SPECIFIC GRAVITY URINE: 1.02
SQUAMOUS EPITHELIAL CELLS: 2 /HPF
UROBILINOGEN URINE: NEGATIVE MG/DL
VIRAL LOAD INTERP: NORMAL
VIRAL LOAD LOG: NORMAL LG COP/ML
WHITE BLOOD CELLS URINE: 1 /HPF

## 2018-05-16 ENCOUNTER — RX RENEWAL (OUTPATIENT)
Age: 43
End: 2018-05-16

## 2018-05-23 ENCOUNTER — RX RENEWAL (OUTPATIENT)
Age: 43
End: 2018-05-23

## 2018-06-14 ENCOUNTER — APPOINTMENT (OUTPATIENT)
Dept: OPHTHALMOLOGY | Facility: CLINIC | Age: 43
End: 2018-06-14

## 2018-07-27 ENCOUNTER — RX RENEWAL (OUTPATIENT)
Age: 43
End: 2018-07-27

## 2018-08-29 ENCOUNTER — RX RENEWAL (OUTPATIENT)
Age: 43
End: 2018-08-29

## 2018-09-18 ENCOUNTER — APPOINTMENT (OUTPATIENT)
Dept: INFECTIOUS DISEASE | Facility: CLINIC | Age: 43
End: 2018-09-18
Payer: MEDICAID

## 2018-09-18 VITALS
HEART RATE: 82 BPM | WEIGHT: 136 LBS | TEMPERATURE: 98.8 F | HEIGHT: 64 IN | BODY MASS INDEX: 23.22 KG/M2 | SYSTOLIC BLOOD PRESSURE: 90 MMHG | DIASTOLIC BLOOD PRESSURE: 64 MMHG | OXYGEN SATURATION: 98 %

## 2018-09-18 DIAGNOSIS — A60.00 HERPESVIRAL INFECTION OF UROGENITAL SYSTEM, UNSPECIFIED: ICD-10-CM

## 2018-09-18 DIAGNOSIS — Z23 ENCOUNTER FOR IMMUNIZATION: ICD-10-CM

## 2018-09-18 PROCEDURE — G0008: CPT

## 2018-09-18 PROCEDURE — 90686 IIV4 VACC NO PRSV 0.5 ML IM: CPT

## 2018-09-18 PROCEDURE — 99214 OFFICE O/P EST MOD 30 MIN: CPT | Mod: 25

## 2018-09-19 LAB
25(OH)D3 SERPL-MCNC: 23.3 NG/ML
ALBUMIN SERPL ELPH-MCNC: 4.6 G/DL
ALP BLD-CCNC: 58 U/L
ALT SERPL-CCNC: 9 U/L
ANION GAP SERPL CALC-SCNC: 14 MMOL/L
APPEARANCE: CLEAR
AST SERPL-CCNC: 18 U/L
BACTERIA: NEGATIVE
BASOPHILS # BLD AUTO: 0.03 K/UL
BASOPHILS NFR BLD AUTO: 0.6 %
BILIRUB SERPL-MCNC: 0.2 MG/DL
BILIRUBIN URINE: NEGATIVE
BLOOD URINE: NEGATIVE
BUN SERPL-MCNC: 10 MG/DL
C TRACH RRNA SPEC QL NAA+PROBE: NOT DETECTED
CALCIUM SERPL-MCNC: 10 MG/DL
CD3 CELLS # BLD: 932 /UL
CD3 CELLS NFR BLD: 68 %
CD3+CD4+ CELLS # BLD: 493 /UL
CD3+CD4+ CELLS NFR BLD: 36 %
CD3+CD4+ CELLS/CD3+CD8+ CLL SPEC: 1.17 RATIO
CD3+CD8+ CELLS # SPEC: 421 /UL
CD3+CD8+ CELLS NFR BLD: 31 %
CHLORIDE SERPL-SCNC: 103 MMOL/L
CHOLEST SERPL-MCNC: 183 MG/DL
CHOLEST/HDLC SERPL: 2.5 RATIO
CO2 SERPL-SCNC: 26 MMOL/L
COLOR: YELLOW
CREAT SERPL-MCNC: 0.67 MG/DL
EOSINOPHIL # BLD AUTO: 0.07 K/UL
EOSINOPHIL NFR BLD AUTO: 1.3 %
GLUCOSE QUALITATIVE U: NEGATIVE MG/DL
GLUCOSE SERPL-MCNC: 68 MG/DL
HBA1C MFR BLD HPLC: 5.4 %
HCT VFR BLD CALC: 39.4 %
HDLC SERPL-MCNC: 74 MG/DL
HGB BLD-MCNC: 12.4 G/DL
HIV1 RNA # SERPL NAA+PROBE: NORMAL
HIV1 RNA # SERPL NAA+PROBE: NORMAL COPIES/ML
HYALINE CASTS: 0 /LPF
IMM GRANULOCYTES NFR BLD AUTO: 0 %
KETONES URINE: NEGATIVE
LACTATE BLDA-MCNC: 1.2 MMOL/L
LDLC SERPL CALC-MCNC: 90 MG/DL
LEUKOCYTE ESTERASE URINE: NEGATIVE
LYMPHOCYTES # BLD AUTO: 1.41 K/UL
LYMPHOCYTES NFR BLD AUTO: 26.9 %
MAN DIFF?: NORMAL
MCHC RBC-ENTMCNC: 30 PG
MCHC RBC-ENTMCNC: 31.5 GM/DL
MCV RBC AUTO: 95.2 FL
MICROSCOPIC-UA: NORMAL
MONOCYTES # BLD AUTO: 0.3 K/UL
MONOCYTES NFR BLD AUTO: 5.7 %
N GONORRHOEA RRNA SPEC QL NAA+PROBE: NOT DETECTED
NEUTROPHILS # BLD AUTO: 3.43 K/UL
NEUTROPHILS NFR BLD AUTO: 65.5 %
NITRITE URINE: NEGATIVE
PH URINE: 7.5
PLATELET # BLD AUTO: 340 K/UL
POTASSIUM SERPL-SCNC: 4 MMOL/L
PROT SERPL-MCNC: 8 G/DL
PROTEIN URINE: NEGATIVE MG/DL
RBC # BLD: 4.14 M/UL
RBC # FLD: 14 %
RED BLOOD CELLS URINE: 6 /HPF
SODIUM SERPL-SCNC: 143 MMOL/L
SOURCE AMPLIFICATION: NORMAL
SPECIFIC GRAVITY URINE: 1.02
SQUAMOUS EPITHELIAL CELLS: 3 /HPF
T PALLIDUM AB SER QL IA: NEGATIVE
TRIGL SERPL-MCNC: 96 MG/DL
TSH SERPL-ACNC: 1.45 UIU/ML
UROBILINOGEN URINE: NEGATIVE MG/DL
VIRAL LOAD INTERP: NORMAL
VIRAL LOAD LOG: NORMAL LG COP/ML
WBC # FLD AUTO: 5.24 K/UL
WHITE BLOOD CELLS URINE: 7 /HPF

## 2018-10-25 ENCOUNTER — RX RENEWAL (OUTPATIENT)
Age: 43
End: 2018-10-25

## 2018-10-26 ENCOUNTER — RX RENEWAL (OUTPATIENT)
Age: 43
End: 2018-10-26

## 2018-11-15 ENCOUNTER — RX RENEWAL (OUTPATIENT)
Age: 43
End: 2018-11-15

## 2018-12-04 ENCOUNTER — APPOINTMENT (OUTPATIENT)
Dept: INFECTIOUS DISEASE | Facility: CLINIC | Age: 43
End: 2018-12-04
Payer: MEDICAID

## 2018-12-04 VITALS
TEMPERATURE: 97 F | WEIGHT: 139 LBS | HEART RATE: 74 BPM | HEIGHT: 64 IN | OXYGEN SATURATION: 98 % | BODY MASS INDEX: 23.73 KG/M2 | SYSTOLIC BLOOD PRESSURE: 94 MMHG | DIASTOLIC BLOOD PRESSURE: 65 MMHG

## 2018-12-04 DIAGNOSIS — L73.9 FOLLICULAR DISORDER, UNSPECIFIED: ICD-10-CM

## 2018-12-04 PROCEDURE — 99214 OFFICE O/P EST MOD 30 MIN: CPT

## 2018-12-13 ENCOUNTER — RX RENEWAL (OUTPATIENT)
Age: 43
End: 2018-12-13

## 2019-01-01 ENCOUNTER — OUTPATIENT (OUTPATIENT)
Dept: OUTPATIENT SERVICES | Facility: HOSPITAL | Age: 44
LOS: 1 days | End: 2019-01-01
Payer: MEDICAID

## 2019-01-01 PROCEDURE — G9001: CPT

## 2019-01-18 ENCOUNTER — RX RENEWAL (OUTPATIENT)
Age: 44
End: 2019-01-18

## 2019-01-22 DIAGNOSIS — Z71.89 OTHER SPECIFIED COUNSELING: ICD-10-CM

## 2019-02-13 ENCOUNTER — RX RENEWAL (OUTPATIENT)
Age: 44
End: 2019-02-13

## 2019-03-18 ENCOUNTER — RX RENEWAL (OUTPATIENT)
Age: 44
End: 2019-03-18

## 2019-04-11 ENCOUNTER — RX RENEWAL (OUTPATIENT)
Age: 44
End: 2019-04-11

## 2019-05-14 ENCOUNTER — RX RENEWAL (OUTPATIENT)
Age: 44
End: 2019-05-14

## 2019-05-28 ENCOUNTER — APPOINTMENT (OUTPATIENT)
Dept: INFECTIOUS DISEASE | Facility: CLINIC | Age: 44
End: 2019-05-28
Payer: MEDICAID

## 2019-05-28 VITALS
TEMPERATURE: 97 F | SYSTOLIC BLOOD PRESSURE: 103 MMHG | BODY MASS INDEX: 24.59 KG/M2 | DIASTOLIC BLOOD PRESSURE: 61 MMHG | OXYGEN SATURATION: 99 % | HEIGHT: 64 IN | HEART RATE: 75 BPM | WEIGHT: 144 LBS

## 2019-05-28 LAB
CD3 CELLS # BLD: 1227 /UL
CD3 CELLS NFR BLD: 70 %
CD3+CD4+ CELLS # BLD: 621 /UL
CD3+CD4+ CELLS NFR BLD: 36 %
CD3+CD4+ CELLS/CD3+CD8+ CLL SPEC: 1.04 RATIO
CD3+CD8+ CELLS # SPEC: 598 /UL
CD3+CD8+ CELLS NFR BLD: 34 %

## 2019-05-28 PROCEDURE — 99214 OFFICE O/P EST MOD 30 MIN: CPT

## 2019-05-28 NOTE — PHYSICAL EXAM
[General Appearance - Well Nourished] : well nourished [PERRL With Normal Accommodation] : pupils were equal in size, round, reactive to light [Neck Cervical Mass (___cm)] : no neck mass was observed [Neck Appearance] : the appearance of the neck was normal [Thyroid Diffuse Enlargement] : the thyroid was not enlarged [Jugular Venous Distention Increased] : there was no jugular-venous distention [Heart Sounds] : normal S1 and S2 [Heart Rate And Rhythm] : heart rate was normal and rhythm regular [Auscultation Breath Sounds / Voice Sounds] : lungs were clear to auscultation bilaterally [Heart Sounds Gallop] : no gallops [Murmurs] : no murmurs [Heart Sounds Pericardial Friction Rub] : no pericardial rub [Full Pulse] : the pedal pulses are present [Edema] : there was no peripheral edema [Abdomen Soft] : soft [Bowel Sounds] : normal bowel sounds [Abdomen Mass (___ Cm)] : no abdominal mass palpated [Abdomen Tenderness] : non-tender [Costovertebral Angle Tenderness] : no CVA tenderness [No Palpable Adenopathy] : no palpable adenopathy [Nail Clubbing] : no clubbing  or cyanosis of the fingernails [Musculoskeletal - Swelling] : no joint swelling [Skin Color & Pigmentation] : normal skin color and pigmentation [] : no rash [Motor Tone] : muscle strength and tone were normal [Affect] : the affect was normal [Oriented To Time, Place, And Person] : oriented to person, place, and time [FreeTextEntry1] : throat pain, reddened tonsils.

## 2019-05-28 NOTE — HISTORY OF PRESENT ILLNESS
[FreeTextEntry1] : History of Present Illness\par Reason For Visit\par 2019----JUSTUS LYNN is a 43 year old female being seen for a follow-up visit. On Genvoya and Valtrex and Vit D3. last Mammogram was 2017, pt will make appt for mammogram. Flu vaccine today.see in one year all was normal.  states had Pneumonioa vaccine over 5 years ago. All labs today see in 4 months. Needs GYN Desmond Mckay. Needs Rheumatology appy for long term Joint pain over the years.  \par  \par  History of Present Illness\par 42 year old female. English and Gibraltarian speaking. Diagnosed 24 tears ago. Contracted HIV from her first  who  from HIV in .  second  in  and transmitted hiv to her second . Was originally treated for HIV at [facility] in [place], then was at [facility] with Dr Tracie Euceda, Decided to change over. Taking Genvoya for past 7 months started 2016. Doing well. Takes only Genvoya and valtrex for herpetic outbreaks. Recently states seen in ER for herniated cervical disc, pain to left arm. \par medical hx: HIV, was Hep C positive positive treated in , was treated for hep C first 17 years ago. Hep C now resolved. Surgery- only eye surgery. \par Both parent alive- father vision and hearing. Mother 75 year old.-Thyroid, deminia, high cholesterol, osteoposis, parkinsons. \par \par \par Sexual History: The patient is sexually active. The patient is for some encounters. She is currently sexually active with male partner(s). \par STI, Dates, Treatment: herpes, vatrex. \par Travel: yes. \par Occupation: no, not at present. \par Lives with . \par Who is aware of HIV status: yes. \par  \par Social History\par no drinking or smoking \par \par Current Meds\par Genvoya and valtrex ( 500mg daily) supressive. \par \par Allergies\par seafood. (shrimp rash) \par \par

## 2019-05-28 NOTE — ASSESSMENT
[Treatment Adherence] : treatment adherence [Treatment Education] : treatment education [HIV Education] : HIV Education [Drug Interactions / Side Effects] : drug interactions/side effects [Anticipatory Guidance] : anticipatory guidance [FreeTextEntry1] : 5/28/2019----JUSTUS LYNN is a 43 year old female being seen for a follow-up visit. On Genvoya and Valtrex and Vit D3. last Mammogram was august 2017, pt will make appt for mammogram. Flu vaccine today.see in one year all was normal.  states had Pneumonioa vaccine over 5 years ago. All labs today see in 4 months. Needs GYN Desmond Mckay. Needs Rheumatology appy for long term Joint pain over the years.

## 2019-05-29 LAB
25(OH)D3 SERPL-MCNC: 24.5 NG/ML
ALBUMIN SERPL ELPH-MCNC: 3.8 G/DL
ALP BLD-CCNC: 59 U/L
ALT SERPL-CCNC: 10 U/L
ANION GAP SERPL CALC-SCNC: 8 MMOL/L
APPEARANCE: CLEAR
AST SERPL-CCNC: 13 U/L
BACTERIA: NEGATIVE
BASOPHILS # BLD AUTO: 0.06 K/UL
BASOPHILS NFR BLD AUTO: 1 %
BILIRUB SERPL-MCNC: <0.2 MG/DL
BILIRUBIN URINE: NEGATIVE
BLOOD URINE: NEGATIVE
BUN SERPL-MCNC: 15 MG/DL
C TRACH RRNA SPEC QL NAA+PROBE: NOT DETECTED
CALCIUM SERPL-MCNC: 8.9 MG/DL
CHLORIDE SERPL-SCNC: 107 MMOL/L
CHOLEST SERPL-MCNC: 166 MG/DL
CHOLEST/HDLC SERPL: 2.6 RATIO
CO2 SERPL-SCNC: 24 MMOL/L
COLOR: YELLOW
CREAT SERPL-MCNC: 0.71 MG/DL
EOSINOPHIL # BLD AUTO: 0.08 K/UL
EOSINOPHIL NFR BLD AUTO: 1.3 %
GLUCOSE QUALITATIVE U: NEGATIVE
GLUCOSE SERPL-MCNC: 88 MG/DL
HCT VFR BLD CALC: 36.5 %
HDLC SERPL-MCNC: 63 MG/DL
HGB BLD-MCNC: 11.5 G/DL
HIV1 RNA # SERPL NAA+PROBE: NORMAL
HIV1 RNA # SERPL NAA+PROBE: NORMAL COPIES/ML
HYALINE CASTS: 2 /LPF
IMM GRANULOCYTES NFR BLD AUTO: 0.3 %
KETONES URINE: NEGATIVE
LDLC SERPL CALC-MCNC: 88 MG/DL
LEUKOCYTE ESTERASE URINE: ABNORMAL
LYMPHOCYTES # BLD AUTO: 1.87 K/UL
LYMPHOCYTES NFR BLD AUTO: 30.2 %
MAN DIFF?: NORMAL
MCHC RBC-ENTMCNC: 30.3 PG
MCHC RBC-ENTMCNC: 31.5 GM/DL
MCV RBC AUTO: 96.3 FL
MICROSCOPIC-UA: NORMAL
MONOCYTES # BLD AUTO: 0.53 K/UL
MONOCYTES NFR BLD AUTO: 8.6 %
N GONORRHOEA RRNA SPEC QL NAA+PROBE: NOT DETECTED
NEUTROPHILS # BLD AUTO: 3.63 K/UL
NEUTROPHILS NFR BLD AUTO: 58.6 %
NITRITE URINE: NEGATIVE
PH URINE: 8
PLATELET # BLD AUTO: 345 K/UL
POTASSIUM SERPL-SCNC: 5.2 MMOL/L
PROT SERPL-MCNC: 6.7 G/DL
PROTEIN URINE: ABNORMAL
RBC # BLD: 3.79 M/UL
RBC # FLD: 13.5 %
RED BLOOD CELLS URINE: 3 /HPF
SODIUM SERPL-SCNC: 139 MMOL/L
SOURCE AMPLIFICATION: NORMAL
SPECIFIC GRAVITY URINE: 1.03
SQUAMOUS EPITHELIAL CELLS: 6 /HPF
T PALLIDUM AB SER QL IA: NEGATIVE
TRIGL SERPL-MCNC: 77 MG/DL
UROBILINOGEN URINE: NORMAL
VIRAL LOAD INTERP: NORMAL
VIRAL LOAD LOG: NORMAL LG COP/ML
WBC # FLD AUTO: 6.19 K/UL
WHITE BLOOD CELLS URINE: 8 /HPF

## 2019-06-03 LAB
M TB IFN-G BLD-IMP: POSITIVE
QUANTIFERON TB PLUS MITOGEN MINUS NIL: 3.51 IU/ML
QUANTIFERON TB PLUS NIL: 0.5 IU/ML
QUANTIFERON TB PLUS TB1 MINUS NIL: 0.24 IU/ML
QUANTIFERON TB PLUS TB2 MINUS NIL: 0.78 IU/ML

## 2019-06-04 ENCOUNTER — APPOINTMENT (OUTPATIENT)
Dept: INFECTIOUS DISEASE | Facility: CLINIC | Age: 44
End: 2019-06-04

## 2019-06-04 ENCOUNTER — LABORATORY RESULT (OUTPATIENT)
Age: 44
End: 2019-06-04

## 2019-06-12 ENCOUNTER — RX RENEWAL (OUTPATIENT)
Age: 44
End: 2019-06-12

## 2019-07-09 ENCOUNTER — RX RENEWAL (OUTPATIENT)
Age: 44
End: 2019-07-09

## 2019-07-22 ENCOUNTER — FORM ENCOUNTER (OUTPATIENT)
Age: 44
End: 2019-07-22

## 2019-07-23 ENCOUNTER — APPOINTMENT (OUTPATIENT)
Dept: RHEUMATOLOGY | Facility: CLINIC | Age: 44
End: 2019-07-23
Payer: MEDICAID

## 2019-07-23 VITALS
HEART RATE: 79 BPM | SYSTOLIC BLOOD PRESSURE: 96 MMHG | OXYGEN SATURATION: 98 % | BODY MASS INDEX: 24.41 KG/M2 | DIASTOLIC BLOOD PRESSURE: 65 MMHG | HEIGHT: 64 IN | TEMPERATURE: 98.6 F | WEIGHT: 143 LBS

## 2019-07-23 LAB
ALBUMIN SERPL ELPH-MCNC: 4 G/DL
ALP BLD-CCNC: 57 U/L
ALT SERPL-CCNC: 7 U/L
ANION GAP SERPL CALC-SCNC: 8 MMOL/L
APPEARANCE: CLEAR
AST SERPL-CCNC: 12 U/L
BILIRUB SERPL-MCNC: 0.4 MG/DL
BILIRUBIN URINE: NEGATIVE
BLOOD URINE: NEGATIVE
BUN SERPL-MCNC: 12 MG/DL
CALCIUM SERPL-MCNC: 9.3 MG/DL
CHLORIDE SERPL-SCNC: 105 MMOL/L
CO2 SERPL-SCNC: 27 MMOL/L
COLOR: NORMAL
CREAT SERPL-MCNC: 0.69 MG/DL
CRP SERPL-MCNC: <0.1 MG/DL
ERYTHROCYTE [SEDIMENTATION RATE] IN BLOOD BY WESTERGREN METHOD: 17 MM/HR
GLUCOSE QUALITATIVE U: NEGATIVE
GLUCOSE SERPL-MCNC: 74 MG/DL
IRON SATN MFR SERPL: 36 %
IRON SERPL-MCNC: 128 UG/DL
KETONES URINE: NEGATIVE
LEUKOCYTE ESTERASE URINE: NEGATIVE
NITRITE URINE: NEGATIVE
PH URINE: 8.5
POTASSIUM SERPL-SCNC: 4.2 MMOL/L
PROT SERPL-MCNC: 7.1 G/DL
PROTEIN URINE: NEGATIVE
RHEUMATOID FACT SER QL: 11 IU/ML
SODIUM SERPL-SCNC: 140 MMOL/L
SPECIFIC GRAVITY URINE: 1.01
TIBC SERPL-MCNC: 360 UG/DL
UIBC SERPL-MCNC: 232 UG/DL
UROBILINOGEN URINE: NORMAL

## 2019-07-23 PROCEDURE — 73130 X-RAY EXAM OF HAND: CPT | Mod: LT

## 2019-07-23 PROCEDURE — 99204 OFFICE O/P NEW MOD 45 MIN: CPT

## 2019-07-23 PROCEDURE — 73630 X-RAY EXAM OF FOOT: CPT | Mod: RT

## 2019-07-23 PROCEDURE — 73562 X-RAY EXAM OF KNEE 3: CPT | Mod: LT,RT

## 2019-07-23 RX ORDER — DOXYCYCLINE HYCLATE 100 MG/1
100 CAPSULE ORAL
Qty: 14 | Refills: 0 | Status: DISCONTINUED | COMMUNITY
Start: 2018-12-04 | End: 2019-07-23

## 2019-07-23 RX ORDER — KETOTIFEN FUMARATE 0.25 MG/ML
0.03 SOLUTION OPHTHALMIC
Qty: 1 | Refills: 0 | Status: DISCONTINUED | COMMUNITY
Start: 2017-07-18 | End: 2019-07-23

## 2019-07-24 LAB
DSDNA AB SER-ACNC: <12 IU/ML
ENA RNP AB SER IA-ACNC: <0.2 AL
ENA SM AB SER IA-ACNC: <0.2 AL
ENA SS-A AB SER IA-ACNC: <0.2 AL
ENA SS-B AB SER IA-ACNC: <0.2 AL
FOLATE SERPL-MCNC: 8.7 NG/ML
TSH SERPL-ACNC: 1.19 UIU/ML
VIT B12 SERPL-MCNC: 515 PG/ML

## 2019-07-25 LAB — ANA SER IF-ACNC: NEGATIVE

## 2019-07-28 LAB
CCP AB SER IA-ACNC: <8 UNITS
RF+CCP IGG SER-IMP: NEGATIVE

## 2019-08-05 ENCOUNTER — RX RENEWAL (OUTPATIENT)
Age: 44
End: 2019-08-05

## 2019-09-03 ENCOUNTER — RX RENEWAL (OUTPATIENT)
Age: 44
End: 2019-09-03

## 2019-09-10 RX ORDER — PREDNISONE 10 MG/1
10 TABLET ORAL
Qty: 7 | Refills: 1 | Status: DISCONTINUED | COMMUNITY
Start: 2019-08-13 | End: 2019-09-10

## 2019-09-27 ENCOUNTER — RX RENEWAL (OUTPATIENT)
Age: 44
End: 2019-09-27

## 2019-09-30 ENCOUNTER — RX RENEWAL (OUTPATIENT)
Age: 44
End: 2019-09-30

## 2019-10-04 ENCOUNTER — RX RENEWAL (OUTPATIENT)
Age: 44
End: 2019-10-04

## 2019-10-22 ENCOUNTER — APPOINTMENT (OUTPATIENT)
Age: 44
End: 2019-10-22
Payer: MEDICAID

## 2019-10-22 VITALS
WEIGHT: 145 LBS | SYSTOLIC BLOOD PRESSURE: 93 MMHG | HEIGHT: 64 IN | HEART RATE: 81 BPM | OXYGEN SATURATION: 99 % | TEMPERATURE: 98.1 F | DIASTOLIC BLOOD PRESSURE: 63 MMHG | BODY MASS INDEX: 24.75 KG/M2

## 2019-10-22 PROCEDURE — 93970 EXTREMITY STUDY: CPT

## 2019-10-22 PROCEDURE — 99203 OFFICE O/P NEW LOW 30 MIN: CPT

## 2019-10-22 PROCEDURE — 36468 NJX SCLRSNT SPIDER VEINS: CPT

## 2019-10-29 ENCOUNTER — APPOINTMENT (OUTPATIENT)
Dept: INFECTIOUS DISEASE | Facility: CLINIC | Age: 44
End: 2019-10-29

## 2019-11-12 ENCOUNTER — APPOINTMENT (OUTPATIENT)
Dept: INFECTIOUS DISEASE | Facility: CLINIC | Age: 44
End: 2019-11-12

## 2019-11-12 ENCOUNTER — APPOINTMENT (OUTPATIENT)
Dept: INFECTIOUS DISEASE | Facility: CLINIC | Age: 44
End: 2019-11-12
Payer: COMMERCIAL

## 2019-11-12 VITALS
BODY MASS INDEX: 24.41 KG/M2 | SYSTOLIC BLOOD PRESSURE: 92 MMHG | OXYGEN SATURATION: 100 % | WEIGHT: 143 LBS | RESPIRATION RATE: 14 BRPM | HEART RATE: 70 BPM | DIASTOLIC BLOOD PRESSURE: 63 MMHG | HEIGHT: 64 IN | TEMPERATURE: 97.7 F

## 2019-11-12 DIAGNOSIS — M25.50 PAIN IN UNSPECIFIED JOINT: ICD-10-CM

## 2019-11-12 DIAGNOSIS — Z00.00 ENCOUNTER FOR GENERAL ADULT MEDICAL EXAMINATION W/OUT ABNORMAL FINDINGS: ICD-10-CM

## 2019-11-12 PROCEDURE — 99214 OFFICE O/P EST MOD 30 MIN: CPT

## 2019-11-12 NOTE — HISTORY OF PRESENT ILLNESS
[FreeTextEntry1] : 2019----JUSTUS LYNN is a 43 year old female being seen for a follow-up visit. On Genvoya and Valtrex and Vit D3. Patient complains of nasal congestion and cough x 1 month. She was seen by urgent care 2-3 weeks ago and was given Z-john, and something for congestion. She states she got better, but feels as if the symptoms are returning. She has seen the Rheumatologist and was given 2 different medications which she states she did not like. Otherwise no other complaints. \par  \par  History of Present Illness\par 42 year old female. English and Latvian speaking. Diagnosed 24 tears ago. Contracted HIV from her first  who  from HIV in .  second  in  and transmitted hiv to her second . Was originally treated for HIV at [facility] in [place], then was at [facility] with Dr Tracie Euceda, Decided to change over. Taking Genvoya for past 7 months started 2016. Doing well. Takes only Genvoya and valtrex for herpetic outbreaks. Recently states seen in ER for herniated cervical disc, pain to left arm. \par medical hx: HIV, was Hep C positive positive treated in , was treated for hep C first 17 years ago. Hep C now resolved. Surgery- only eye surgery. \par Both parent alive- father vision and hearing. Mother 75 year old.-Thyroid, deminia, high cholesterol, osteoposis, parkinsons. \par \par \par Sexual History: The patient is sexually active. The patient is for some encounters. She is currently sexually active with male partner(s). \par STI, Dates, Treatment: herpes, vatrex. \par Travel: yes. \par Occupation: no, not at present. \par Lives with . \par Who is aware of HIV status: yes. \par  \par Social History\par no drinking or smoking \par \par Current Meds\par Genvoya and valtrex ( 500mg daily) supressive. \par

## 2019-11-12 NOTE — ASSESSMENT
[FreeTextEntry1] : 11/12/2019----JUSTUS LYNN is a 43 year old female being seen for a follow-up visit. On Genvoya and Valtrex and Vit D3. Patient complains of nasal congestion and cough x 1 month. She was seen by urgent care 2-3 weeks ago and was given Z-john, and something for congestion. She states she got better, but feels as if the symptoms are returning. She has seen the Rheumatologist and was given 2 different medications which she states she did not like. Otherwise no other complaints. RVP, and throat culture done. Labs today.  [Treatment Education] : treatment education [Treatment Adherence] : treatment adherence [Risk Reduction] : risk reduction [HIV Education] : HIV Education [Medical Care Issues] : medical care issues [Anticipatory Guidance] : anticipatory guidance

## 2019-11-12 NOTE — PHYSICAL EXAM
[General Appearance - Well Nourished] : well nourished [PERRL With Normal Accommodation] : pupils were equal in size, round, reactive to light [FreeTextEntry1] : throat pain, reddened tonsils.  [Neck Appearance] : the appearance of the neck was normal [Neck Cervical Mass (___cm)] : no neck mass was observed [Thyroid Diffuse Enlargement] : the thyroid was not enlarged [Jugular Venous Distention Increased] : there was no jugular-venous distention [Auscultation Breath Sounds / Voice Sounds] : lungs were clear to auscultation bilaterally [Heart Rate And Rhythm] : heart rate was normal and rhythm regular [Heart Sounds] : normal S1 and S2 [Heart Sounds Gallop] : no gallops [Heart Sounds Pericardial Friction Rub] : no pericardial rub [Murmurs] : no murmurs [Edema] : there was no peripheral edema [Full Pulse] : the pedal pulses are present [Bowel Sounds] : normal bowel sounds [Abdomen Soft] : soft [Abdomen Tenderness] : non-tender [Abdomen Mass (___ Cm)] : no abdominal mass palpated [Costovertebral Angle Tenderness] : no CVA tenderness [No Palpable Adenopathy] : no palpable adenopathy [Musculoskeletal - Swelling] : no joint swelling [Nail Clubbing] : no clubbing  or cyanosis of the fingernails [Motor Tone] : muscle strength and tone were normal [Skin Color & Pigmentation] : normal skin color and pigmentation [] : no rash [Oriented To Time, Place, And Person] : oriented to person, place, and time [Affect] : the affect was normal

## 2019-11-15 LAB
25(OH)D3 SERPL-MCNC: 20.2 NG/ML
ALBUMIN SERPL ELPH-MCNC: 4.1 G/DL
ALP BLD-CCNC: 67 U/L
ALT SERPL-CCNC: 6 U/L
ANION GAP SERPL CALC-SCNC: 10 MMOL/L
APPEARANCE: CLEAR
AST SERPL-CCNC: 15 U/L
BACTERIA: NEGATIVE
BASOPHILS # BLD AUTO: 0.04 K/UL
BASOPHILS NFR BLD AUTO: 0.7 %
BILIRUB SERPL-MCNC: 0.3 MG/DL
BILIRUBIN URINE: NEGATIVE
BLOOD URINE: NEGATIVE
BUN SERPL-MCNC: 11 MG/DL
C TRACH RRNA SPEC QL NAA+PROBE: NOT DETECTED
CALCIUM SERPL-MCNC: 9.3 MG/DL
CD3 CELLS # BLD: 883 /UL
CD3 CELLS NFR BLD: 68 %
CD3+CD4+ CELLS # BLD: 458 /UL
CD3+CD4+ CELLS NFR BLD: 36 %
CD3+CD4+ CELLS/CD3+CD8+ CLL SPEC: 1.11 RATIO
CD3+CD8+ CELLS # SPEC: 412 /UL
CD3+CD8+ CELLS NFR BLD: 32 %
CHLORIDE SERPL-SCNC: 105 MMOL/L
CO2 SERPL-SCNC: 26 MMOL/L
COLOR: NORMAL
CREAT SERPL-MCNC: 0.66 MG/DL
EOSINOPHIL # BLD AUTO: 0.13 K/UL
EOSINOPHIL NFR BLD AUTO: 2.1 %
ESTIMATED AVERAGE GLUCOSE: 100 MG/DL
GLUCOSE QUALITATIVE U: NEGATIVE
GLUCOSE SERPL-MCNC: 79 MG/DL
HBA1C MFR BLD HPLC: 5.1 %
HCT VFR BLD CALC: 39.8 %
HGB BLD-MCNC: 12.2 G/DL
HIV1 RNA # SERPL NAA+PROBE: NORMAL
HIV1 RNA # SERPL NAA+PROBE: NORMAL COPIES/ML
HYALINE CASTS: 1 /LPF
IMM GRANULOCYTES NFR BLD AUTO: 0.3 %
KETONES URINE: NEGATIVE
LEUKOCYTE ESTERASE URINE: NEGATIVE
LYMPHOCYTES # BLD AUTO: 1.37 K/UL
LYMPHOCYTES NFR BLD AUTO: 22.4 %
MAN DIFF?: NORMAL
MCHC RBC-ENTMCNC: 30.1 PG
MCHC RBC-ENTMCNC: 30.7 GM/DL
MCV RBC AUTO: 98.3 FL
MICROSCOPIC-UA: NORMAL
MONOCYTES # BLD AUTO: 0.55 K/UL
MONOCYTES NFR BLD AUTO: 9 %
N GONORRHOEA RRNA SPEC QL NAA+PROBE: NOT DETECTED
NEUTROPHILS # BLD AUTO: 4 K/UL
NEUTROPHILS NFR BLD AUTO: 65.5 %
NITRITE URINE: NEGATIVE
PH URINE: 7
PLATELET # BLD AUTO: 302 K/UL
POTASSIUM SERPL-SCNC: 4.1 MMOL/L
PROT SERPL-MCNC: 7.1 G/DL
PROTEIN URINE: NEGATIVE
RAPID RVP RESULT: DETECTED
RBC # BLD: 4.05 M/UL
RBC # FLD: 14.3 %
RED BLOOD CELLS URINE: 2 /HPF
RV+EV RNA SPEC QL NAA+PROBE: DETECTED
SODIUM SERPL-SCNC: 140 MMOL/L
SOURCE AMPLIFICATION: NORMAL
SPECIFIC GRAVITY URINE: 1.02
SQUAMOUS EPITHELIAL CELLS: 1 /HPF
T PALLIDUM AB SER QL IA: NEGATIVE
UROBILINOGEN URINE: NORMAL
VIRAL LOAD INTERP: NORMAL
VIRAL LOAD LOG: NORMAL LG COP/ML
WBC # FLD AUTO: 6.11 K/UL
WHITE BLOOD CELLS URINE: 0 /HPF

## 2019-11-20 ENCOUNTER — RX RENEWAL (OUTPATIENT)
Age: 44
End: 2019-11-20

## 2019-11-25 LAB — BACTERIA THROAT CULT: NORMAL

## 2019-12-17 ENCOUNTER — RX RENEWAL (OUTPATIENT)
Age: 44
End: 2019-12-17

## 2020-01-16 ENCOUNTER — RX RENEWAL (OUTPATIENT)
Age: 45
End: 2020-01-16

## 2020-01-21 ENCOUNTER — RX RENEWAL (OUTPATIENT)
Age: 45
End: 2020-01-21

## 2020-01-28 ENCOUNTER — APPOINTMENT (OUTPATIENT)
Dept: VASCULAR SURGERY | Facility: CLINIC | Age: 45
End: 2020-01-28
Payer: COMMERCIAL

## 2020-01-28 VITALS
HEIGHT: 64 IN | OXYGEN SATURATION: 100 % | BODY MASS INDEX: 24.04 KG/M2 | TEMPERATURE: 98.5 F | SYSTOLIC BLOOD PRESSURE: 113 MMHG | HEART RATE: 96 BPM | DIASTOLIC BLOOD PRESSURE: 80 MMHG | WEIGHT: 140.8 LBS

## 2020-01-28 DIAGNOSIS — I83.813 VARICOSE VEINS OF BILATERAL LOWER EXTREMITIES WITH PAIN: ICD-10-CM

## 2020-01-28 PROCEDURE — 36468 NJX SCLRSNT SPIDER VEINS: CPT

## 2020-01-28 RX ORDER — GABAPENTIN 100 MG/1
100 CAPSULE ORAL
Qty: 90 | Refills: 3 | Status: DISCONTINUED | COMMUNITY
Start: 2019-09-10 | End: 2020-01-28

## 2020-01-28 RX ORDER — CHOLECALCIFEROL (VITAMIN D3) 25 MCG
25 MCG CAPSULE ORAL
Qty: 30 | Refills: 4 | Status: DISCONTINUED | COMMUNITY
Start: 2019-03-18 | End: 2020-01-28

## 2020-01-28 RX ORDER — AMOXICILLIN AND CLAVULANATE POTASSIUM 875; 125 MG/1; MG/1
875-125 TABLET, COATED ORAL TWICE DAILY
Qty: 20 | Refills: 0 | Status: DISCONTINUED | COMMUNITY
Start: 2019-11-12 | End: 2020-01-28

## 2020-01-28 RX ORDER — MULTIVIT-MIN/FOLIC/VIT K/LYCOP 400-300MCG
25 MCG TABLET ORAL DAILY
Qty: 30 | Refills: 5 | Status: DISCONTINUED | COMMUNITY
Start: 2017-11-21 | End: 2020-01-28

## 2020-01-28 NOTE — PROCEDURE
[FreeTextEntry1] : bilateral LE sclerotherapy [FreeTextEntry2] : varicose veins with pain [FreeTextEntry3] : After informed consent obtained, under aseptic technique 2 vials or 4 mL of 0.5% polidocanol sclerosing solution were used to perform sclerotherapy of varicose veins of both lower extremities with a 33 gauge needle. A sterile pressure dressing was applied. The patient tolerated the procedure well.\par

## 2020-02-11 ENCOUNTER — APPOINTMENT (OUTPATIENT)
Dept: VASCULAR SURGERY | Facility: CLINIC | Age: 45
End: 2020-02-11

## 2020-02-18 ENCOUNTER — APPOINTMENT (OUTPATIENT)
Dept: INFECTIOUS DISEASE | Facility: CLINIC | Age: 45
End: 2020-02-18

## 2020-02-25 ENCOUNTER — RX RENEWAL (OUTPATIENT)
Age: 45
End: 2020-02-25

## 2020-05-12 ENCOUNTER — RX RENEWAL (OUTPATIENT)
Age: 45
End: 2020-05-12

## 2020-05-12 RX ORDER — KETOTIFEN FUMARATE 0.25 MG/ML
0.03 SOLUTION/ DROPS OPHTHALMIC
Qty: 5 | Refills: 0 | Status: DISCONTINUED | COMMUNITY
Start: 2018-02-23 | End: 2020-05-12

## 2020-05-20 ENCOUNTER — FORM ENCOUNTER (OUTPATIENT)
Age: 45
End: 2020-05-20

## 2020-05-21 ENCOUNTER — APPOINTMENT (OUTPATIENT)
Dept: INFECTIOUS DISEASE | Facility: CLINIC | Age: 45
End: 2020-05-21
Payer: MEDICAID

## 2020-05-21 DIAGNOSIS — Z12.11 ENCOUNTER FOR SCREENING FOR MALIGNANT NEOPLASM OF COLON: ICD-10-CM

## 2020-05-21 PROCEDURE — 99442: CPT

## 2020-05-26 ENCOUNTER — APPOINTMENT (OUTPATIENT)
Dept: INFECTIOUS DISEASE | Facility: CLINIC | Age: 45
End: 2020-05-26

## 2020-06-16 ENCOUNTER — APPOINTMENT (OUTPATIENT)
Dept: INFECTIOUS DISEASE | Facility: CLINIC | Age: 45
End: 2020-06-16

## 2020-06-16 LAB
CD3 CELLS # BLD: 1327 /UL
CD3 CELLS NFR BLD: 76 %
CD3+CD4+ CELLS # BLD: 746 /UL
CD3+CD4+ CELLS NFR BLD: 43 %
CD3+CD4+ CELLS/CD3+CD8+ CLL SPEC: 1.31 RATIO
CD3+CD8+ CELLS # SPEC: 569 /UL
CD3+CD8+ CELLS NFR BLD: 33 %

## 2020-06-17 LAB
25(OH)D3 SERPL-MCNC: 18.9 NG/ML
ALBUMIN SERPL ELPH-MCNC: 4.2 G/DL
ALP BLD-CCNC: 70 U/L
ALT SERPL-CCNC: 13 U/L
ANION GAP SERPL CALC-SCNC: 16 MMOL/L
APPEARANCE: CLEAR
AST SERPL-CCNC: 20 U/L
BACTERIA: NEGATIVE
BASOPHILS # BLD AUTO: 0.05 K/UL
BASOPHILS NFR BLD AUTO: 0.8 %
BILIRUB SERPL-MCNC: <0.2 MG/DL
BILIRUBIN URINE: NEGATIVE
BLOOD URINE: NEGATIVE
BUN SERPL-MCNC: 11 MG/DL
CALCIUM SERPL-MCNC: 9.3 MG/DL
CHLORIDE SERPL-SCNC: 101 MMOL/L
CHOLEST SERPL-MCNC: 184 MG/DL
CHOLEST/HDLC SERPL: 2.5 RATIO
CO2 SERPL-SCNC: 23 MMOL/L
COLOR: NORMAL
CREAT SERPL-MCNC: 0.72 MG/DL
EOSINOPHIL # BLD AUTO: 0.1 K/UL
EOSINOPHIL NFR BLD AUTO: 1.6 %
ESTIMATED AVERAGE GLUCOSE: 97 MG/DL
GLUCOSE QUALITATIVE U: NEGATIVE
GLUCOSE SERPL-MCNC: 100 MG/DL
HBA1C MFR BLD HPLC: 5 %
HCT VFR BLD CALC: 40.7 %
HDLC SERPL-MCNC: 74 MG/DL
HGB BLD-MCNC: 13.1 G/DL
HYALINE CASTS: 0 /LPF
IMM GRANULOCYTES NFR BLD AUTO: 0.3 %
KETONES URINE: NEGATIVE
LDLC SERPL CALC-MCNC: 91 MG/DL
LEUKOCYTE ESTERASE URINE: NEGATIVE
LYMPHOCYTES # BLD AUTO: 1.79 K/UL
LYMPHOCYTES NFR BLD AUTO: 28.5 %
MAN DIFF?: NORMAL
MCHC RBC-ENTMCNC: 31.7 PG
MCHC RBC-ENTMCNC: 32.2 GM/DL
MCV RBC AUTO: 98.5 FL
MICROSCOPIC-UA: NORMAL
MONOCYTES # BLD AUTO: 0.33 K/UL
MONOCYTES NFR BLD AUTO: 5.2 %
NEUTROPHILS # BLD AUTO: 4 K/UL
NEUTROPHILS NFR BLD AUTO: 63.6 %
NITRITE URINE: NEGATIVE
PH URINE: 5.5
PLATELET # BLD AUTO: 361 K/UL
POTASSIUM SERPL-SCNC: 4 MMOL/L
PROT SERPL-MCNC: 7.3 G/DL
PROTEIN URINE: NEGATIVE
RBC # BLD: 4.13 M/UL
RBC # FLD: 13.2 %
RED BLOOD CELLS URINE: 1 /HPF
SARS-COV-2 IGG SERPL IA-ACNC: <0.1 INDEX
SARS-COV-2 IGG SERPL QL IA: NEGATIVE
SODIUM SERPL-SCNC: 141 MMOL/L
SPECIFIC GRAVITY URINE: 1.02
SQUAMOUS EPITHELIAL CELLS: 1 /HPF
TRIGL SERPL-MCNC: 92 MG/DL
TSH SERPL-ACNC: 1.58 UIU/ML
UROBILINOGEN URINE: NORMAL
WBC # FLD AUTO: 6.29 K/UL
WHITE BLOOD CELLS URINE: 2 /HPF

## 2020-06-18 LAB
C TRACH RRNA SPEC QL NAA+PROBE: NOT DETECTED
HIV1 RNA # SERPL NAA+PROBE: NORMAL
HIV1 RNA # SERPL NAA+PROBE: NORMAL COPIES/ML
M TB IFN-G BLD-IMP: NEGATIVE
N GONORRHOEA RRNA SPEC QL NAA+PROBE: NOT DETECTED
QUANTIFERON TB PLUS MITOGEN MINUS NIL: 2.72 IU/ML
QUANTIFERON TB PLUS NIL: 5.79 IU/ML
QUANTIFERON TB PLUS TB1 MINUS NIL: 0.06 IU/ML
QUANTIFERON TB PLUS TB2 MINUS NIL: -1.22 IU/ML
SOURCE AMPLIFICATION: NORMAL
VIRAL LOAD INTERP: NORMAL
VIRAL LOAD LOG: NORMAL LG COP/ML

## 2020-06-22 LAB — T PALLIDUM AB SER QL IA: NEGATIVE

## 2020-06-23 ENCOUNTER — APPOINTMENT (OUTPATIENT)
Dept: INFECTIOUS DISEASE | Facility: CLINIC | Age: 45
End: 2020-06-23

## 2020-06-23 ENCOUNTER — RX RENEWAL (OUTPATIENT)
Age: 45
End: 2020-06-23

## 2020-09-21 ENCOUNTER — RX RENEWAL (OUTPATIENT)
Age: 45
End: 2020-09-21

## 2020-10-16 ENCOUNTER — RX RENEWAL (OUTPATIENT)
Age: 45
End: 2020-10-16

## 2020-10-21 ENCOUNTER — NON-APPOINTMENT (OUTPATIENT)
Age: 45
End: 2020-10-21

## 2020-12-07 ENCOUNTER — FORM ENCOUNTER (OUTPATIENT)
Age: 45
End: 2020-12-07

## 2020-12-08 ENCOUNTER — RESULT REVIEW (OUTPATIENT)
Age: 45
End: 2020-12-08

## 2020-12-08 ENCOUNTER — APPOINTMENT (OUTPATIENT)
Dept: MAMMOGRAPHY | Facility: CLINIC | Age: 45
End: 2020-12-08
Payer: MEDICAID

## 2020-12-08 ENCOUNTER — APPOINTMENT (OUTPATIENT)
Dept: INFECTIOUS DISEASE | Facility: CLINIC | Age: 45
End: 2020-12-08
Payer: MEDICAID

## 2020-12-08 ENCOUNTER — OUTPATIENT (OUTPATIENT)
Dept: OUTPATIENT SERVICES | Facility: HOSPITAL | Age: 45
LOS: 1 days | End: 2020-12-08
Payer: MEDICAID

## 2020-12-08 ENCOUNTER — RX RENEWAL (OUTPATIENT)
Age: 45
End: 2020-12-08

## 2020-12-08 VITALS
SYSTOLIC BLOOD PRESSURE: 105 MMHG | HEART RATE: 75 BPM | RESPIRATION RATE: 16 BRPM | DIASTOLIC BLOOD PRESSURE: 70 MMHG | WEIGHT: 145 LBS | BODY MASS INDEX: 24.75 KG/M2 | HEIGHT: 64 IN | OXYGEN SATURATION: 99 % | TEMPERATURE: 98.3 F

## 2020-12-08 DIAGNOSIS — Z00.00 ENCOUNTER FOR GENERAL ADULT MEDICAL EXAMINATION WITHOUT ABNORMAL FINDINGS: ICD-10-CM

## 2020-12-08 LAB
BASOPHILS # BLD AUTO: 0.07 K/UL
BASOPHILS NFR BLD AUTO: 1.3 %
EOSINOPHIL # BLD AUTO: 0.1 K/UL
EOSINOPHIL NFR BLD AUTO: 1.8 %
HCT VFR BLD CALC: 39.3 %
HGB BLD-MCNC: 12.8 G/DL
IMM GRANULOCYTES NFR BLD AUTO: 0.2 %
LYMPHOCYTES # BLD AUTO: 1.57 K/UL
LYMPHOCYTES NFR BLD AUTO: 28.1 %
MAN DIFF?: NORMAL
MCHC RBC-ENTMCNC: 31.4 PG
MCHC RBC-ENTMCNC: 32.6 GM/DL
MCV RBC AUTO: 96.6 FL
MONOCYTES # BLD AUTO: 0.34 K/UL
MONOCYTES NFR BLD AUTO: 6.1 %
NEUTROPHILS # BLD AUTO: 3.49 K/UL
NEUTROPHILS NFR BLD AUTO: 62.5 %
PLATELET # BLD AUTO: 347 K/UL
RBC # BLD: 4.07 M/UL
RBC # FLD: 12.8 %
WBC # FLD AUTO: 5.58 K/UL

## 2020-12-08 PROCEDURE — 77067 SCR MAMMO BI INCL CAD: CPT | Mod: 26

## 2020-12-08 PROCEDURE — 77063 BREAST TOMOSYNTHESIS BI: CPT | Mod: 26

## 2020-12-08 PROCEDURE — 99072 ADDL SUPL MATRL&STAF TM PHE: CPT

## 2020-12-08 PROCEDURE — G0008: CPT

## 2020-12-08 PROCEDURE — 99214 OFFICE O/P EST MOD 30 MIN: CPT | Mod: 25

## 2020-12-08 PROCEDURE — 90686 IIV4 VACC NO PRSV 0.5 ML IM: CPT

## 2020-12-08 PROCEDURE — 77063 BREAST TOMOSYNTHESIS BI: CPT

## 2020-12-08 PROCEDURE — 77067 SCR MAMMO BI INCL CAD: CPT

## 2020-12-08 NOTE — PHYSICAL EXAM
[General Appearance - Well Nourished] : well nourished [PERRL With Normal Accommodation] : pupils were equal in size, round, reactive to light [FreeTextEntry1] : throat pain, reddened tonsils.  [Neck Appearance] : the appearance of the neck was normal [Neck Cervical Mass (___cm)] : no neck mass was observed [Jugular Venous Distention Increased] : there was no jugular-venous distention [Thyroid Diffuse Enlargement] : the thyroid was not enlarged [Auscultation Breath Sounds / Voice Sounds] : lungs were clear to auscultation bilaterally [Heart Rate And Rhythm] : heart rate was normal and rhythm regular [Heart Sounds] : normal S1 and S2 [Heart Sounds Gallop] : no gallops [Murmurs] : no murmurs [Heart Sounds Pericardial Friction Rub] : no pericardial rub [Full Pulse] : the pedal pulses are present [Edema] : there was no peripheral edema [Bowel Sounds] : normal bowel sounds [Abdomen Soft] : soft [Abdomen Tenderness] : non-tender [Abdomen Mass (___ Cm)] : no abdominal mass palpated [Costovertebral Angle Tenderness] : no CVA tenderness [No Palpable Adenopathy] : no palpable adenopathy [Musculoskeletal - Swelling] : no joint swelling [Nail Clubbing] : no clubbing  or cyanosis of the fingernails [Motor Tone] : muscle strength and tone were normal [Skin Color & Pigmentation] : normal skin color and pigmentation [] : no rash [Oriented To Time, Place, And Person] : oriented to person, place, and time [Affect] : the affect was normal

## 2020-12-08 NOTE — HISTORY OF PRESENT ILLNESS
[FreeTextEntry1] : 2020\par JUSTUS LYNN is a 45 year old female being seen for a follow-up visit. \par On Genvoya and Valtrex and Vit D3. Doing well no complaints. \par Pt will make GYN appt today. \par Pt has scheduled mammogram for today 2020\par Plan 2020:\par 1) labwork today  see in 4 months\par 2) needs GI ( father had colon ca) needs ebval if needing colonoscopy before age 50\par 3) go to rhematology for joint pain \par  \par  History of Present Illness\par 42 year old female. English and Grenadian speaking. Diagnosed 24 tears ago. Contracted HIV from her first  who  from HIV in .  second  in  and transmitted hiv to her second . Was originally treated for HIV at [facility] in [place], then was at [facility] with Dr Tracie Euceda, Decided to change over. Taking Genvoya for past 7 months started 2016. Doing well. Takes only Genvoya and valtrex for herpetic outbreaks. Recently states seen in ER for herniated cervical disc, pain to left arm. \par medical hx: HIV, was Hep C positive positive treated in , was treated for hep C first 17 years ago. Hep C now resolved. Surgery- only eye surgery. \par Both parent alive- father vision and hearing. Mother 75 year old.-Thyroid, deminia, high cholesterol, osteoposis, parkinsons. \par \par \par Sexual History: The patient is sexually active. The patient is for some encounters. She is currently sexually active with male partner(s). \par STI, Dates, Treatment: herpes, vatrex. \par Travel: yes. \par Occupation: no, not at present. \par Lives with . \par Who is aware of HIV status: yes. \par  \par Social History\par no drinking or smoking \par \par Current Meds\par Genvoya and valtrex ( 500mg daily) supressive. \par \par  \par Active Problems\par Flu vaccine need (V04.81) (Z23)\par Folliculitis (704.8) (L73.9)\par Headache (784.0) (R51)\par Herpes, genital (054.10) (A60.00)\par HIV disease (042) (B20)\par Joint pain (719.40) (M25.50)\par Missed menses (626.4) (N92.6)\par Other herniation of intervertebral disc of cervical spine (722.0) (M50.20)\par Polyarthritis (716.50) (M13.0)\par Seasonal allergic rhinitis, unspecified chronicity, unspecified trigger\par Sore throat (462) (J02.9)\par Stress incontinence, female (625.6) (N39.3)\par Varicose veins of both lower extremities with pain (454.8) (I83.813)\par \par Past Medical History\par History of hepatitis C virus infection (V12.09) (Z86.19)\par History of Visit for dental examination (V72.2) (Z01.20)\par History of Visit for eye and vision exam (V72.0) (Z01.00)\par History of Visit for gynecologic examination (V72.31) (Z01.419)\par \par Current Meds\par pt stopped Gabapentin 100 MG Oral Capsule; 1 tablet at bed time, if no side effects every 3 days can\par increase bytablets until 300 mg\par Genvoya [phone] MG Oral Tablet; TAKE ONE TABLET BY MOUTH ONCE A DAY\par valACYclovir HCl - 500 MG Oral Tablet; TAKE ONE TABLET BY MOUTH ONCE A DAY\par Vitamin D High Potency 25 MCG (1000 UT) Oral Capsule; TAKE ONE CAPSULE BY\par MOUTH ONCE A DAY\par Vitamin D3 25 MCG (1000 UT) Oral Tablet; TAKE 1 TABLET DAILY\par \par Allergies\par No Known Drug Allergies\par Fish\par Shellfish\par

## 2020-12-09 ENCOUNTER — NON-APPOINTMENT (OUTPATIENT)
Age: 45
End: 2020-12-09

## 2020-12-09 LAB
25(OH)D3 SERPL-MCNC: 38.7 NG/ML
ALBUMIN SERPL ELPH-MCNC: 4.3 G/DL
ALP BLD-CCNC: 59 U/L
ALT SERPL-CCNC: 7 U/L
ANION GAP SERPL CALC-SCNC: 10 MMOL/L
APPEARANCE: CLEAR
AST SERPL-CCNC: 15 U/L
BACTERIA: NEGATIVE
BILIRUB SERPL-MCNC: 0.2 MG/DL
BILIRUBIN URINE: NEGATIVE
BLOOD URINE: NEGATIVE
BUN SERPL-MCNC: 13 MG/DL
CALCIUM SERPL-MCNC: 9.7 MG/DL
CD3 CELLS # BLD: 1103 /UL
CD3 CELLS NFR BLD: 68 %
CD3+CD4+ CELLS # BLD: 600 /UL
CD3+CD4+ CELLS NFR BLD: 37 %
CD3+CD4+ CELLS/CD3+CD8+ CLL SPEC: 1.23 RATIO
CD3+CD8+ CELLS # SPEC: 488 /UL
CD3+CD8+ CELLS NFR BLD: 30 %
CHLORIDE SERPL-SCNC: 105 MMOL/L
CHOLEST SERPL-MCNC: 190 MG/DL
CO2 SERPL-SCNC: 26 MMOL/L
COLOR: COLORLESS
CREAT SERPL-MCNC: 0.67 MG/DL
ESTIMATED AVERAGE GLUCOSE: 108 MG/DL
GLUCOSE QUALITATIVE U: NEGATIVE
GLUCOSE SERPL-MCNC: 86 MG/DL
HBA1C MFR BLD HPLC: 5.4 %
HDLC SERPL-MCNC: 74 MG/DL
HIV1 RNA # SERPL NAA+PROBE: NORMAL
HIV1 RNA # SERPL NAA+PROBE: NORMAL COPIES/ML
HYALINE CASTS: 1 /LPF
KETONES URINE: NEGATIVE
LDLC SERPL CALC-MCNC: 90 MG/DL
LEUKOCYTE ESTERASE URINE: ABNORMAL
MICROSCOPIC-UA: NORMAL
NITRITE URINE: NEGATIVE
NONHDLC SERPL-MCNC: 116 MG/DL
PH URINE: 5.5
POTASSIUM SERPL-SCNC: 4.3 MMOL/L
PROT SERPL-MCNC: 7.5 G/DL
PROTEIN URINE: NEGATIVE
RED BLOOD CELLS URINE: 3 /HPF
SODIUM SERPL-SCNC: 141 MMOL/L
SPECIFIC GRAVITY URINE: 1.01
SQUAMOUS EPITHELIAL CELLS: 6 /HPF
T PALLIDUM AB SER QL IA: NEGATIVE
TRIGL SERPL-MCNC: 128 MG/DL
TSH SERPL-ACNC: 1.53 UIU/ML
UROBILINOGEN URINE: NORMAL
VIRAL LOAD INTERP: NORMAL
VIRAL LOAD LOG: NORMAL LG COP/ML
WHITE BLOOD CELLS URINE: 13 /HPF

## 2020-12-10 LAB
C TRACH RRNA SPEC QL NAA+PROBE: NOT DETECTED
M TB IFN-G BLD-IMP: NEGATIVE
N GONORRHOEA RRNA SPEC QL NAA+PROBE: NOT DETECTED
QUANTIFERON TB PLUS MITOGEN MINUS NIL: 7.62 IU/ML
QUANTIFERON TB PLUS NIL: 0.07 IU/ML
QUANTIFERON TB PLUS TB1 MINUS NIL: -0.01 IU/ML
QUANTIFERON TB PLUS TB2 MINUS NIL: -0.01 IU/ML
SOURCE AMPLIFICATION: NORMAL

## 2020-12-23 ENCOUNTER — RX RENEWAL (OUTPATIENT)
Age: 45
End: 2020-12-23

## 2021-01-05 ENCOUNTER — APPOINTMENT (OUTPATIENT)
Dept: INFECTIOUS DISEASE | Facility: CLINIC | Age: 46
End: 2021-01-05
Payer: MEDICAID

## 2021-01-05 VITALS
BODY MASS INDEX: 25.78 KG/M2 | TEMPERATURE: 98.4 F | DIASTOLIC BLOOD PRESSURE: 65 MMHG | RESPIRATION RATE: 14 BRPM | OXYGEN SATURATION: 96 % | HEART RATE: 84 BPM | HEIGHT: 64 IN | SYSTOLIC BLOOD PRESSURE: 94 MMHG | WEIGHT: 151 LBS

## 2021-01-05 PROCEDURE — 99214 OFFICE O/P EST MOD 30 MIN: CPT

## 2021-01-05 PROCEDURE — 99072 ADDL SUPL MATRL&STAF TM PHE: CPT

## 2021-01-06 LAB — HPV HIGH+LOW RISK DNA PNL CVX: NOT DETECTED

## 2021-01-07 LAB
C TRACH RRNA SPEC QL NAA+PROBE: NOT DETECTED
N GONORRHOEA RRNA SPEC QL NAA+PROBE: NOT DETECTED
SOURCE AMPLIFICATION: NORMAL

## 2021-01-11 LAB — CYTOLOGY CVX/VAG DOC THIN PREP: ABNORMAL

## 2021-01-19 ENCOUNTER — NON-APPOINTMENT (OUTPATIENT)
Age: 46
End: 2021-01-19

## 2021-01-19 ENCOUNTER — APPOINTMENT (OUTPATIENT)
Dept: OPHTHALMOLOGY | Facility: CLINIC | Age: 46
End: 2021-01-19
Payer: MEDICAID

## 2021-01-19 PROCEDURE — 92004 COMPRE OPH EXAM NEW PT 1/>: CPT

## 2021-01-19 PROCEDURE — 99072 ADDL SUPL MATRL&STAF TM PHE: CPT

## 2021-01-19 PROCEDURE — 92015 DETERMINE REFRACTIVE STATE: CPT

## 2021-03-08 ENCOUNTER — RX RENEWAL (OUTPATIENT)
Age: 46
End: 2021-03-08

## 2021-04-12 ENCOUNTER — FORM ENCOUNTER (OUTPATIENT)
Age: 46
End: 2021-04-12

## 2021-04-13 ENCOUNTER — APPOINTMENT (OUTPATIENT)
Dept: INFECTIOUS DISEASE | Facility: CLINIC | Age: 46
End: 2021-04-13
Payer: MEDICAID

## 2021-04-13 VITALS
OXYGEN SATURATION: 98 % | SYSTOLIC BLOOD PRESSURE: 125 MMHG | DIASTOLIC BLOOD PRESSURE: 81 MMHG | HEART RATE: 77 BPM | TEMPERATURE: 97.8 F | BODY MASS INDEX: 25.1 KG/M2 | WEIGHT: 147 LBS | HEIGHT: 64 IN

## 2021-04-13 LAB
ALBUMIN SERPL ELPH-MCNC: 4.1 G/DL
ALP BLD-CCNC: 72 U/L
ALT SERPL-CCNC: 10 U/L
ANION GAP SERPL CALC-SCNC: 8 MMOL/L
AST SERPL-CCNC: 15 U/L
BASOPHILS # BLD AUTO: 0.04 K/UL
BASOPHILS NFR BLD AUTO: 0.7 %
BILIRUB SERPL-MCNC: <0.2 MG/DL
BUN SERPL-MCNC: 14 MG/DL
CALCIUM SERPL-MCNC: 10.1 MG/DL
CHLORIDE SERPL-SCNC: 106 MMOL/L
CHOLEST SERPL-MCNC: 192 MG/DL
CO2 SERPL-SCNC: 27 MMOL/L
CREAT SERPL-MCNC: 0.67 MG/DL
EOSINOPHIL # BLD AUTO: 0.11 K/UL
EOSINOPHIL NFR BLD AUTO: 1.8 %
ESTIMATED AVERAGE GLUCOSE: 111 MG/DL
GLUCOSE SERPL-MCNC: 81 MG/DL
HBA1C MFR BLD HPLC: 5.5 %
HCT VFR BLD CALC: 38.8 %
HDLC SERPL-MCNC: 73 MG/DL
HGB BLD-MCNC: 12.3 G/DL
IMM GRANULOCYTES NFR BLD AUTO: 0.2 %
LDLC SERPL CALC-MCNC: 97 MG/DL
LYMPHOCYTES # BLD AUTO: 1.67 K/UL
LYMPHOCYTES NFR BLD AUTO: 27.7 %
MAN DIFF?: NORMAL
MCHC RBC-ENTMCNC: 30.7 PG
MCHC RBC-ENTMCNC: 31.7 GM/DL
MCV RBC AUTO: 96.8 FL
MONOCYTES # BLD AUTO: 0.41 K/UL
MONOCYTES NFR BLD AUTO: 6.8 %
NEUTROPHILS # BLD AUTO: 3.79 K/UL
NEUTROPHILS NFR BLD AUTO: 62.8 %
NONHDLC SERPL-MCNC: 118 MG/DL
PLATELET # BLD AUTO: 339 K/UL
POTASSIUM SERPL-SCNC: 4.4 MMOL/L
PROT SERPL-MCNC: 7.2 G/DL
RBC # BLD: 4.01 M/UL
RBC # FLD: 12.9 %
SODIUM SERPL-SCNC: 141 MMOL/L
TRIGL SERPL-MCNC: 108 MG/DL
WBC # FLD AUTO: 6.03 K/UL

## 2021-04-13 PROCEDURE — 99213 OFFICE O/P EST LOW 20 MIN: CPT

## 2021-04-13 PROCEDURE — 99072 ADDL SUPL MATRL&STAF TM PHE: CPT

## 2021-04-13 NOTE — HISTORY OF PRESENT ILLNESS
[FreeTextEntry1] : \par 2021\par JUSTUS LYNN is a 45 year old female being seen for a follow-up visit. \par On Genvoya and Valtrex and Vit D3. Doing well.  Continued joint pains and numbness of hands and feet? Diagnosed HIV in . Needs to follow up with both neuro and rheum. \par up to date with gyn and mammo\par Plan 2021:\par 1) labwork today see in 6 months, continue Genvoya and Valtrex and Vit D3\par 2) needs GI ( father had colon ca) needs ebval if needing colonoscopy before age 50\par 3) Needs to follow up with both neuro and rheum\par  \par  History of Present Illness\par 42 year old female. English and Serbian speaking. Diagnosed 24 tears ago. Contracted HIV from her first  who  from HIV in .  second  in  and transmitted hiv to her second . Was originally treated for HIV at [facility] in [place], then was at [facility] with Dr Tracie Euceda, Decided to change over. Taking Genvoya for past 7 months started 2016. Doing well. Takes only Genvoya and valtrex for herpetic outbreaks. Recently states seen in ER for herniated cervical disc, pain to left arm. \par medical hx: HIV, was Hep C positive positive treated in , was treated for hep C first 17 years ago. Hep C now resolved. Surgery- only eye surgery. \par Both parent alive- father vision and hearing. Mother 75 year old.-Thyroid, deminia, high cholesterol, osteoposis, parkinsons. \par \par \par Sexual History: The patient is sexually active. The patient is for some encounters. She is currently sexually active with male partner(s). \par STI, Dates, Treatment: herpes, vatrex. \par Travel: yes. \par Occupation: no, not at present. \par Lives with . \par Who is aware of HIV status: yes. \par  \par Social History\par no drinking or smoking \par \par Current Meds\par Genvoya and valtrex ( 500mg daily) supressive. \par \par  \par Active Problems\par Flu vaccine need (V04.81) (Z23)\par Folliculitis (704.8) (L73.9)\par Headache (784.0) (R51)\par Herpes, genital (054.10) (A60.00)\par HIV disease (042) (B20)\par Joint pain (719.40) (M25.50)\par Missed menses (626.4) (N92.6)\par Other herniation of intervertebral disc of cervical spine (722.0) (M50.20)\par Polyarthritis (716.50) (M13.0)\par Seasonal allergic rhinitis, unspecified chronicity, unspecified trigger\par Sore throat (462) (J02.9)\par Stress incontinence, female (625.6) (N39.3)\par Varicose veins of both lower extremities with pain (454.8) (I83.813)\par \par Past Medical History\par History of hepatitis C virus infection (V12.09) (Z86.19)\par History of Visit for dental examination (V72.2) (Z01.20)\par History of Visit for eye and vision exam (V72.0) (Z01.00)\par History of Visit for gynecologic examination (V72.31) (Z01.419)\par \par Current Meds\par pt stopped Gabapentin 100 MG Oral Capsule; 1 tablet at bed time, if no side effects every 3 days can\par increase bytablets until 300 mg\par Genvoya [phone] MG Oral Tablet; TAKE ONE TABLET BY MOUTH ONCE A DAY\par valACYclovir HCl - 500 MG Oral Tablet; TAKE ONE TABLET BY MOUTH ONCE A DAY\par Vitamin D High Potency 25 MCG (1000 UT) Oral Capsule; TAKE ONE CAPSULE BY\par MOUTH ONCE A DAY\par Vitamin D3 25 MCG (1000 UT) Oral Tablet; TAKE 1 TABLET DAILY\par \par Allergies\par No Known Drug Allergies\par Fish\par Shellfish\par

## 2021-04-15 LAB
25(OH)D3 SERPL-MCNC: 41.4 NG/ML
APPEARANCE: CLEAR
BACTERIA: NEGATIVE
BILIRUBIN URINE: NEGATIVE
BLOOD URINE: NEGATIVE
CD3 CELLS # BLD: 1133 /UL
CD3 CELLS NFR BLD: 70 %
CD3+CD4+ CELLS # BLD: 623 /UL
CD3+CD4+ CELLS NFR BLD: 39 %
CD3+CD4+ CELLS/CD3+CD8+ CLL SPEC: 1.25 RATIO
CD3+CD8+ CELLS # SPEC: 498 /UL
CD3+CD8+ CELLS NFR BLD: 31 %
COLOR: NORMAL
FOLATE SERPL-MCNC: 8.6 NG/ML
GLUCOSE QUALITATIVE U: NEGATIVE
HBV SURFACE AG SER QL: NONREACTIVE
HIV1 RNA # SERPL NAA+PROBE: NORMAL
HIV1 RNA # SERPL NAA+PROBE: NORMAL COPIES/ML
HYALINE CASTS: 0 /LPF
KETONES URINE: NEGATIVE
LEUKOCYTE ESTERASE URINE: ABNORMAL
MICROSCOPIC-UA: NORMAL
NITRITE URINE: NEGATIVE
PH URINE: 6
PROTEIN URINE: NEGATIVE
RED BLOOD CELLS URINE: 2 /HPF
SPECIFIC GRAVITY URINE: 1.01
SQUAMOUS EPITHELIAL CELLS: 3 /HPF
T PALLIDUM AB SER QL IA: NEGATIVE
UROBILINOGEN URINE: NORMAL
VIRAL LOAD INTERP: NORMAL
VIRAL LOAD LOG: NORMAL LG COP/ML
VIT B12 SERPL-MCNC: 518 PG/ML
WHITE BLOOD CELLS URINE: 24 /HPF

## 2021-05-03 ENCOUNTER — RX RENEWAL (OUTPATIENT)
Age: 46
End: 2021-05-03

## 2021-05-25 ENCOUNTER — RX RENEWAL (OUTPATIENT)
Age: 46
End: 2021-05-25

## 2021-06-22 ENCOUNTER — APPOINTMENT (OUTPATIENT)
Dept: NEUROLOGY | Facility: CLINIC | Age: 46
End: 2021-06-22
Payer: MEDICAID

## 2021-06-22 VITALS
WEIGHT: 146 LBS | SYSTOLIC BLOOD PRESSURE: 113 MMHG | BODY MASS INDEX: 24.92 KG/M2 | DIASTOLIC BLOOD PRESSURE: 75 MMHG | HEIGHT: 64 IN | HEART RATE: 84 BPM

## 2021-06-22 DIAGNOSIS — M67.432 GANGLION, LEFT WRIST: ICD-10-CM

## 2021-06-22 DIAGNOSIS — Z78.9 OTHER SPECIFIED HEALTH STATUS: ICD-10-CM

## 2021-06-22 PROCEDURE — 99204 OFFICE O/P NEW MOD 45 MIN: CPT

## 2021-06-22 NOTE — CONSULT LETTER
[Dear  ___] : Dear  [unfilled], [Consult Letter:] : I had the pleasure of evaluating your patient, [unfilled]. [Please see my note below.] : Please see my note below. [Consult Closing:] : Thank you very much for allowing me to participate in the care of this patient.  If you have any questions, please do not hesitate to contact me. [Sincerely,] : Sincerely, [FreeTextEntry3] : Andre Gonzalez MD\par \par

## 2021-06-22 NOTE — HISTORY OF PRESENT ILLNESS
[FreeTextEntry1] : I had the pleasure of seeing Mrs. JUSTUS LYNN in the office today.  She is a 46 year right-handed patient who was referred for neurologic evaluation at your kind suggestion.\par \par Mrs. Lynn suffers from HIV diagnosed 24 years ago and is treated hepatitis C.  She has been treated with Genvoya for 2 years and Valtrex.\par \par She works as a hairdresser.  For about 4 years, she has experienced nocturnal numbness and tingling in her hands relieved with change in position, left worse than right.  She denies hand weakness or loss of dexterity.  She has a history of neck pain.  She underwent a cervical MRI several years ago was told that she had a herniated disc.  She denies hand weakness.  She experienced brief pedal numbness 2 years ago when she started Genvoya.  She no longer has sensory symptoms in her feet.  She denies headaches, cognitive, visual, hearing, swallowing, gait, strength or sphincteric difficulties.\par \par She denies having contracted COVID-19.  She is hesitant to be vaccinated.

## 2021-06-22 NOTE — PHYSICAL EXAM
[FreeTextEntry1] : Constitutional:  Patient was well-developed, well-nourished and in no acute distress. \par \par Head:  Normocephalic, atraumatic. Tympanic membranes were clear. \par \par Neck:  Supple with full range of motion. \par \par Cardiovascular:  Cardiac rhythm was regular without murmur. There were no carotid bruits. Peripheral pulses were full and symmetric. \par \par Respiratory:  Lungs were clear. \par \par Abdomen:  Soft and nontender. \par \par Spine:  Nontender. \par \par Skin:  There were no rashes. \par \par NEUROLOGICAL EXAMINATION:\par \par Mental Status: Patient was alert and oriented. Speech was fluent. There was no dysarthria. \par \par Cranial Nerves: \par \par II: Visual acuity was 20/ 20 bilaterally with near card. Pupils were equal and reactive. Visual fields were full. Funduscopic examination was normal. \par \par III, IV, VI:  Eye movements were full without nystagmus. \par \par V: Facial sensation was intact. \par \par VII: Facial strength was normal. \par \par VIII: Hearing was equal. \par \par IX, X: Palatal movement was normal. Phonation was normal. \par \par XI: Sternocleidomastoids and trapezii were normal. \par \par XII: Tongue was midline and movements normal. There was no lingual atrophy or fasciculations. \par \par Motor Examination: Muscle bulk, tone and strength were normal. \par \par Sensory Examination: Pinprick, vibration and joint position sense were intact.  There were no Tinel signs at the wrists or elbows.  Phalen's maneuver was negative.  There were small cystic lesions on her left distal radial wrist possibly representing ganglions.\par \par Reflexes: DTRs were 2+ throughout. \par \par Plantar Responses: Plantar responses were flexor. \par \par Coordination/Cerebellar Function: There was no dysmetria on finger to nose or heel to shin testing. \par \par Gait/Stance: Gait and tandem were normal. Romberg was negative.\par

## 2021-06-22 NOTE — ASSESSMENT
[FreeTextEntry1] : Mrs. Robison is a 46-year-old with HIV since 1992 treated with antivirals and treated hepatitis C.  She presents with progressive bilateral predominantly nocturnal hand numbness and tingling.  She does not appear to have a generalized sensory or motor polyneuropathy but rather median entrapments at the wrists.  I cannot exclude HIV or retroviral induced neural injury.\par \par I suggested use of bilateral wrist extension splints at night.  She will return to the office for EMG and nerve conduction studies.  She will be referred for hand surgical consultation.  Further management will depend upon these results and her clinical course.

## 2021-06-28 ENCOUNTER — NON-APPOINTMENT (OUTPATIENT)
Age: 46
End: 2021-06-28

## 2021-06-28 ENCOUNTER — APPOINTMENT (OUTPATIENT)
Dept: ORTHOPEDIC SURGERY | Facility: CLINIC | Age: 46
End: 2021-06-28
Payer: MEDICAID

## 2021-06-28 VITALS
SYSTOLIC BLOOD PRESSURE: 110 MMHG | HEART RATE: 76 BPM | HEIGHT: 60 IN | BODY MASS INDEX: 28.66 KG/M2 | WEIGHT: 146 LBS | DIASTOLIC BLOOD PRESSURE: 73 MMHG

## 2021-06-28 DIAGNOSIS — M65.331 TRIGGER FINGER, RIGHT MIDDLE FINGER: ICD-10-CM

## 2021-06-28 DIAGNOSIS — M65.4 RADIAL STYLOID TENOSYNOVITIS [DE QUERVAIN]: ICD-10-CM

## 2021-06-28 PROCEDURE — 20550 NJX 1 TENDON SHEATH/LIGAMENT: CPT | Mod: LT

## 2021-06-28 PROCEDURE — 73110 X-RAY EXAM OF WRIST: CPT | Mod: RT

## 2021-06-28 PROCEDURE — 99204 OFFICE O/P NEW MOD 45 MIN: CPT | Mod: 25

## 2021-06-29 PROBLEM — M65.331 TRIGGER MIDDLE FINGER OF RIGHT HAND: Status: ACTIVE | Noted: 2021-06-29

## 2021-06-29 PROBLEM — M65.4 DE QUERVAIN'S TENOSYNOVITIS, LEFT: Status: ACTIVE | Noted: 2021-06-29

## 2021-07-08 ENCOUNTER — RX RENEWAL (OUTPATIENT)
Age: 46
End: 2021-07-08

## 2021-07-20 ENCOUNTER — APPOINTMENT (OUTPATIENT)
Dept: RHEUMATOLOGY | Facility: CLINIC | Age: 46
End: 2021-07-20

## 2021-08-23 NOTE — ED ADULT NURSE NOTE - NS ED NURSE RECORD ANOTHER VITAL SIGN
Our goal is to partner with you to improve your health and well being. It is important for you to complete necessary testing and follow the instructions given to you at your clinic visit. Our office will call you within 2 weeks with results of any testing but you may also call sooner to obtain results - [phone].   If you have any questions or concerns please feel free to call us.  We take your care very seriously and we thank you for your trust!
- trial of linzess (take it on an empty stomach each morning, some patients will take it every other day or just as needed), we can also try miralax or psyllium fiber instead.  If you do psyllium then start with 1/2 or 1/4 of the recommended dose and build up over time
-, - schedule colonoscopy, if you have any issues with the prep (ie high cost, not covered) at the pharmacy please let us know
- follow up with Caitlin Varela in 3 months.  If the linzess is working best for you then let me know and I will put in a prescription Yes

## 2021-09-14 ENCOUNTER — APPOINTMENT (OUTPATIENT)
Dept: NEUROLOGY | Facility: CLINIC | Age: 46
End: 2021-09-14
Payer: MEDICAID

## 2021-09-14 PROCEDURE — 95911 NRV CNDJ TEST 9-10 STUDIES: CPT

## 2021-09-14 NOTE — PROCEDURE
[FreeTextEntry1] : Nerve conduction studies.  Electromyography refused. [FreeTextEntry2] : Bilateral hand tingling and suspected carpal tunnel syndrome [FreeTextEntry3] : Electrodiagnostic testing was performed in the upper extremities.\par \par The radial, median and ulnar sensory potentials were normal.  There was mild slowing of median conduction across the wrist segments.  The radial and ulnar sensory conduction velocities were normal.\par \par The right median motor distal latency was mildly prolonged and the left was normal.  The ulnar motor distal latencies were normal.  The median and ulnar compound muscle action potentials and conduction velocities were normal.\par \par The median and ulnar F waves were normal.\par \par The median latencies were prolonged compared to the ulnar latencies in the lumbrical–interosseous comparison studies.\par \par Needle electromyography was refused.\par \par Conclusions: This was an abnormal study.\par \par There was electrophysiologic evidence of mild right worse than left demyelinating median neuropathies at the wrists.  There was no median sensory or motor axon injury.\par \par There was no electrophysiologic evidence of ulnar neuropathy or sensorimotor polyneuropathy.\par \par Clinical correlation: The above study confirms the clinical impression of mild bilateral carpal tunnel syndrome.  For completeness and in the absence of needle electromyography, I will order an MRI of the cervical spine.  She will follow up with Dr. Maria T Grijalva regarding management of carpal tunnel syndrome.

## 2021-09-14 NOTE — CONSULT LETTER
[Dear  ___] : Dear  [unfilled], [Consult Letter:] : I had the pleasure of evaluating your patient, [unfilled]. [Please see my note below.] : Please see my note below. [Consult Closing:] : Thank you very much for allowing me to participate in the care of this patient.  If you have any questions, please do not hesitate to contact me. [Sincerely,] : Sincerely, [FreeTextEntry3] : Andre Gonzalez MD\par  [DrFlorentino  ___] : Dr. FRANCOIS

## 2021-10-04 ENCOUNTER — RX RENEWAL (OUTPATIENT)
Age: 46
End: 2021-10-04

## 2021-10-11 ENCOUNTER — FORM ENCOUNTER (OUTPATIENT)
Age: 46
End: 2021-10-11

## 2021-10-12 ENCOUNTER — LABORATORY RESULT (OUTPATIENT)
Age: 46
End: 2021-10-12

## 2021-10-12 ENCOUNTER — APPOINTMENT (OUTPATIENT)
Dept: INFECTIOUS DISEASE | Facility: CLINIC | Age: 46
End: 2021-10-12
Payer: MEDICAID

## 2021-10-12 VITALS
OXYGEN SATURATION: 98 % | SYSTOLIC BLOOD PRESSURE: 106 MMHG | WEIGHT: 150 LBS | TEMPERATURE: 98.8 F | BODY MASS INDEX: 29.45 KG/M2 | HEART RATE: 75 BPM | DIASTOLIC BLOOD PRESSURE: 71 MMHG | HEIGHT: 60 IN

## 2021-10-12 LAB
25(OH)D3 SERPL-MCNC: 36.8 NG/ML
ALBUMIN SERPL ELPH-MCNC: 4.3 G/DL
ALP BLD-CCNC: 77 U/L
ALT SERPL-CCNC: 26 U/L
ANION GAP SERPL CALC-SCNC: 11 MMOL/L
AST SERPL-CCNC: 23 U/L
BASOPHILS # BLD AUTO: 0.04 K/UL
BASOPHILS NFR BLD AUTO: 0.7 %
BILIRUB SERPL-MCNC: <0.2 MG/DL
BUN SERPL-MCNC: 15 MG/DL
CALCIUM SERPL-MCNC: 9.6 MG/DL
CD3 CELLS # BLD: 936 /UL
CD3 CELLS NFR BLD: 70 %
CD3+CD4+ CELLS # BLD: 500 /UL
CD3+CD4+ CELLS NFR BLD: 37 %
CD3+CD4+ CELLS/CD3+CD8+ CLL SPEC: 1.17 RATIO
CD3+CD8+ CELLS # SPEC: 429 /UL
CD3+CD8+ CELLS NFR BLD: 32 %
CHLORIDE SERPL-SCNC: 106 MMOL/L
CHOLEST SERPL-MCNC: 209 MG/DL
CO2 SERPL-SCNC: 24 MMOL/L
CREAT SERPL-MCNC: 0.68 MG/DL
EOSINOPHIL # BLD AUTO: 0.1 K/UL
EOSINOPHIL NFR BLD AUTO: 1.7 %
ESTIMATED AVERAGE GLUCOSE: 103 MG/DL
GLUCOSE SERPL-MCNC: 96 MG/DL
HBA1C MFR BLD HPLC: 5.2 %
HCT VFR BLD CALC: 39.3 %
HDLC SERPL-MCNC: 76 MG/DL
HGB BLD-MCNC: 12.6 G/DL
IMM GRANULOCYTES NFR BLD AUTO: 0.3 %
LDLC SERPL CALC-MCNC: 117 MG/DL
LYMPHOCYTES # BLD AUTO: 1.46 K/UL
LYMPHOCYTES NFR BLD AUTO: 24.6 %
MAN DIFF?: NORMAL
MCHC RBC-ENTMCNC: 31.7 PG
MCHC RBC-ENTMCNC: 32.1 GM/DL
MCV RBC AUTO: 98.7 FL
MONOCYTES # BLD AUTO: 0.34 K/UL
MONOCYTES NFR BLD AUTO: 5.7 %
NEUTROPHILS # BLD AUTO: 3.98 K/UL
NEUTROPHILS NFR BLD AUTO: 67 %
NONHDLC SERPL-MCNC: 133 MG/DL
PLATELET # BLD AUTO: 331 K/UL
POTASSIUM SERPL-SCNC: 4.4 MMOL/L
PROT SERPL-MCNC: 7.2 G/DL
PSA SERPL-MCNC: <0.01 NG/ML
RBC # BLD: 3.98 M/UL
RBC # FLD: 13.5 %
SODIUM SERPL-SCNC: 141 MMOL/L
TRIGL SERPL-MCNC: 83 MG/DL
TSH SERPL-ACNC: 2.13 UIU/ML
WBC # FLD AUTO: 5.94 K/UL

## 2021-10-12 PROCEDURE — 99213 OFFICE O/P EST LOW 20 MIN: CPT

## 2021-10-12 RX ORDER — ERGOCALCIFEROL 1.25 MG/1
1.25 MG CAPSULE, LIQUID FILLED ORAL
Qty: 4 | Refills: 0 | Status: DISCONTINUED | COMMUNITY
Start: 2020-06-23 | End: 2021-10-12

## 2021-10-12 NOTE — HISTORY OF PRESENT ILLNESS
[FreeTextEntry1] : 10/12/21\par JUSTUS LYNN is a 46 year old female being seen for a follow-up visit. \par On Genvoya and Valtrex and Vit D3. Doing well. Continued joint pains and numbness of hands and feet? Diagnosed HIV in . Needs to follow up with both neuro and rheum. \par up to date with gyn and mammo for  \par Had pfizer both doses same date as \par Plan 10/12/2021:\par 1) labwork today see in 6 months, continue Genvoya and stop Valtrex and see if any breakouts and continue Vit D3 now the 2000iu daily\par 2) needs GI ( father had colon ca) needs ebval if needing colonoscopy before age 50\par 3) Needs to follow up with both neuro and rheum\par \par  \par  History of Present Illness\par 42 year old female. English and Belarusian speaking. Diagnosed 24 tears ago. Contracted HIV from her first  who  from HIV in .  second  in  and transmitted hiv to her second . Was originally treated for HIV at [facility] in [place], then was at [facility] with Dr Tracie Euceda, Decided to change over. Taking Genvoya for past 7 months started 2016. Doing well. Takes only Genvoya and valtrex for herpetic outbreaks. Recently states seen in ER for herniated cervical disc, pain to left arm. \par medical hx: HIV, was Hep C positive positive treated in , was treated for hep C first 17 years ago. Hep C now resolved. Surgery- only eye surgery. \par Both parent alive- father vision and hearing. Mother 75 year old.-Thyroid, deminia, high cholesterol, osteoposis, parkinsons. \par \par \par Sexual History: The patient is sexually active. The patient is for some encounters. She is currently sexually active with male partner(s). \par STI, Dates, Treatment: herpes, vatrex. \par Travel: yes. \par Occupation: no, not at present. \par Lives with . \par Who is aware of HIV status: yes. \par  \par Social History\par no drinking or smoking \par \par Current Meds\par Genvoya and valtrex ( 500mg daily) supressive. \par \par  \par Active Problems\par Flu vaccine need (V04.81) (Z23)\par Folliculitis (704.8) (L73.9)\par Headache (784.0) (R51)\par Herpes, genital (054.10) (A60.00)\par HIV disease (042) (B20)\par Joint pain (719.40) (M25.50)\par Missed menses (626.4) (N92.6)\par Other herniation of intervertebral disc of cervical spine (722.0) (M50.20)\par Polyarthritis (716.50) (M13.0)\par Seasonal allergic rhinitis, unspecified chronicity, unspecified trigger\par Sore throat (462) (J02.9)\par Stress incontinence, female (625.6) (N39.3)\par Varicose veins of both lower extremities with pain (454.8) (I83.813)\par \par Past Medical History\par History of hepatitis C virus infection (V12.09) (Z86.19)\par History of Visit for dental examination (V72.2) (Z01.20)\par History of Visit for eye and vision exam (V72.0) (Z01.00)\par History of Visit for gynecologic examination (V72.31) (Z01.419)\par \par Current Meds\par pt stopped Gabapentin 100 MG Oral Capsule; 1 tablet at bed time, if no side effects every 3 days can\par increase bytablets until 300 mg\par Genvoya [phone] MG Oral Tablet; TAKE ONE TABLET BY MOUTH ONCE A DAY\par valACYclovir HCl - 500 MG Oral Tablet; TAKE ONE TABLET BY MOUTH ONCE A DAY\par Vitamin D High Potency 25 MCG (1000 UT) Oral Capsule; TAKE ONE CAPSULE BY\par MOUTH ONCE A DAY\par Vitamin D3 25 MCG (1000 UT) Oral Tablet; TAKE 1 TABLET DAILY\par \par Allergies\par No Known Drug Allergies\par Fish\par Shellfish\par \par

## 2021-10-14 LAB
APPEARANCE: CLEAR
BACTERIA: NEGATIVE
BILIRUBIN URINE: NEGATIVE
BLOOD URINE: NEGATIVE
COLOR: COLORLESS
GLUCOSE QUALITATIVE U: NEGATIVE
HCV AB SER QL: REACTIVE
HCV S/CO RATIO: 12.74 S/CO
HIV1 RNA # SERPL NAA+PROBE: NORMAL
HIV1 RNA # SERPL NAA+PROBE: NORMAL COPIES/ML
HYALINE CASTS: 1 /LPF
KETONES URINE: NEGATIVE
LEUKOCYTE ESTERASE URINE: NEGATIVE
MICROSCOPIC-UA: NORMAL
NITRITE URINE: NEGATIVE
PH URINE: 6
PROTEIN URINE: NEGATIVE
RED BLOOD CELLS URINE: 2 /HPF
SPECIFIC GRAVITY URINE: 1.01
SQUAMOUS EPITHELIAL CELLS: 3 /HPF
T PALLIDUM AB SER QL IA: NEGATIVE
UROBILINOGEN URINE: NORMAL
VIRAL LOAD INTERP: NORMAL
VIRAL LOAD LOG: NORMAL LG COP/ML
WHITE BLOOD CELLS URINE: 3 /HPF

## 2021-10-18 LAB — HCV RNA FLD QL NAA+PROBE: NEGATIVE

## 2021-10-20 ENCOUNTER — MED ADMIN CHARGE (OUTPATIENT)
Age: 46
End: 2021-10-20

## 2021-11-16 ENCOUNTER — APPOINTMENT (OUTPATIENT)
Dept: MRI IMAGING | Facility: CLINIC | Age: 46
End: 2021-11-16

## 2021-12-02 ENCOUNTER — RX RENEWAL (OUTPATIENT)
Age: 46
End: 2021-12-02

## 2021-12-30 ENCOUNTER — RX RENEWAL (OUTPATIENT)
Age: 46
End: 2021-12-30

## 2022-02-01 ENCOUNTER — RX RENEWAL (OUTPATIENT)
Age: 47
End: 2022-02-01

## 2022-03-08 ENCOUNTER — APPOINTMENT (OUTPATIENT)
Dept: NEUROLOGY | Facility: CLINIC | Age: 47
End: 2022-03-08
Payer: MEDICAID

## 2022-03-08 VITALS
BODY MASS INDEX: 28.66 KG/M2 | WEIGHT: 146 LBS | SYSTOLIC BLOOD PRESSURE: 96 MMHG | HEIGHT: 60 IN | HEART RATE: 76 BPM | DIASTOLIC BLOOD PRESSURE: 65 MMHG

## 2022-03-08 DIAGNOSIS — G56.03 CARPAL TUNNEL SYNDROM,BILATERAL UPPER LIMBS: ICD-10-CM

## 2022-03-08 DIAGNOSIS — M54.2 CERVICALGIA: ICD-10-CM

## 2022-03-08 PROCEDURE — 99214 OFFICE O/P EST MOD 30 MIN: CPT

## 2022-03-08 NOTE — ASSESSMENT
[FreeTextEntry1] : Mrs. Robison is a 46-year-old with HIV since 1992 treated with antivirals and treated hepatitis C. She has mild bilateral carpal tunnel syndrome.  She has de Quervain's tenosynovitis of the left wrist.  I suggested that she return to  for reevaluation.  I will request x-rays of the cervical spine and prescribe physical therapy.  If her neck discomfort does not remit, an MRI of the cervical spine will be ordered.  Further management will depend upon these results and her clinical course.

## 2022-03-08 NOTE — PHYSICAL EXAM
[FreeTextEntry1] : Constitutional:  Patient was well-developed, well-nourished and in no acute distress. \par \par Head:  Normocephalic, atraumatic. Tympanic membranes were clear. \par \par Neck:  Supple with full range of motion.  There was mild tenderness.\par \par Cardiovascular:  Cardiac rhythm was regular without murmur. There were no carotid bruits. Peripheral pulses were full and symmetric. \par \par Respiratory:  Lungs were clear. \par \par Abdomen:  Soft and nontender. \par \par Spine:  Nontender. \par \par Skin:  There were no rashes. \par \par NEUROLOGICAL EXAMINATION:\par \par Mental Status: Patient was alert and oriented. Speech was fluent. There was no dysarthria. \par \par Cranial Nerves: \par \par II: She could finger count bilaterally. Pupils were equal and reactive. Visual fields were full. Funduscopic examination was normal. \par \par III, IV, VI:  Eye movements were full without nystagmus. \par \par V: Facial sensation was intact. \par \par VII: Facial strength was normal. \par \par VIII: Hearing was equal. \par \par IX, X: Palatal movement was normal. Phonation was normal. \par \par XI: Sternocleidomastoids and trapezii were normal. \par \par XII: Tongue was midline and movements normal. There was no lingual atrophy or fasciculations. \par \par Motor Examination: Muscle bulk, tone and strength were normal. \par \par Sensory Examination: Pinprick, vibration and joint position sense were intact.  There were no Tinel signs at the wrists or elbows.  Phalen's maneuver was negative.  There were small cystic lesions on her left distal radial wrist possibly representing ganglions.  There was mild tenderness of the left wrist.\par \par Reflexes: DTRs were 2+ throughout. \par \par Plantar Responses: Plantar responses were flexor. \par \par Coordination/Cerebellar Function: There was no dysmetria on finger to nose or heel to shin testing. \par \par Gait/Stance: Gait and tandem were normal. Romberg was negative.\par

## 2022-03-08 NOTE — CONSULT LETTER
[Dear  ___] : Dear  [unfilled], [Consult Letter:] : I had the pleasure of evaluating your patient, [unfilled]. [Please see my note below.] : Please see my note below. [Consult Closing:] : Thank you very much for allowing me to participate in the care of this patient.  If you have any questions, please do not hesitate to contact me. [Sincerely,] : Sincerely, [DrFlorentino  ___] : Dr. FRANCOIS [FreeTextEntry3] : Andre Gonzalez MD\par \par

## 2022-03-08 NOTE — HISTORY OF PRESENT ILLNESS
[FreeTextEntry1] : Mrs. JUSTUS LYNN return to the office having been last seen on September 14, 2021.  She is a 46 year right-handed patient who suffers from HIV diagnosed 24 years ago and is treated hepatitis C.  She has been treated with Genvoya for 2 years and Valtrex.\par \par She works as a hairdresser.  For about 4 years, she has experienced nocturnal numbness and tingling in her hands relieved with change in position, left worse than right.  She denies hand weakness or loss of dexterity.  She has a history of neck pain.  She underwent a cervical MRI several years ago was told that she had a herniated disc.  She denies hand weakness.  She experienced brief pedal numbness 2 years ago when she started Genvoya.  She no longer has sensory symptoms in her feet.  She denies headaches, cognitive, visual, hearing, swallowing, gait, strength or sphincteric difficulties.\par \par Her symptoms and examination were most compatible with bilateral median entrapment at the wrist.  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  There was no electrophysiologic evidence of ulnar neuropathy or sensorimotor polyneuropathy.  She refused electromyography.\par \par I suggested an MRI of the cervical spine to exclude radiculopathy or myelopathy in view of her chronic neck complaints.  That study was not performed.  She said that she could not schedule it.\par \par She is now using wrist extension splints.  These have alleviated much of her discomfort.  She still has pain in her left wrist.  In June 2021 she was evaluated by Dr. Maria T Grijalva, orthopedist.  She was diagnosed with de Quervain's tenosynovitis of the left wrist.  A steroid injection was administered with improvement.\par \par She still complains of left wrist pain.  She denies weakness.  Her fingers are sometimes painful.  She has chronic neck discomfort.

## 2022-03-22 ENCOUNTER — RX RENEWAL (OUTPATIENT)
Age: 47
End: 2022-03-22

## 2022-04-11 ENCOUNTER — FORM ENCOUNTER (OUTPATIENT)
Age: 47
End: 2022-04-11

## 2022-04-12 ENCOUNTER — APPOINTMENT (OUTPATIENT)
Dept: INFECTIOUS DISEASE | Facility: CLINIC | Age: 47
End: 2022-04-12
Payer: MEDICAID

## 2022-04-12 VITALS
DIASTOLIC BLOOD PRESSURE: 64 MMHG | HEIGHT: 60 IN | HEART RATE: 74 BPM | BODY MASS INDEX: 28.47 KG/M2 | SYSTOLIC BLOOD PRESSURE: 110 MMHG | TEMPERATURE: 98.7 F | OXYGEN SATURATION: 98 % | WEIGHT: 145 LBS

## 2022-04-12 DIAGNOSIS — Z01.419 ENCOUNTER FOR GYNECOLOGICAL EXAMINATION (GENERAL) (ROUTINE) W/OUT ABNORMAL FINDINGS: ICD-10-CM

## 2022-04-12 PROCEDURE — 99213 OFFICE O/P EST LOW 20 MIN: CPT

## 2022-04-12 NOTE — HISTORY OF PRESENT ILLNESS
[FreeTextEntry1] : 22\par JUSTUS LYNN is a 46 year old female being seen for a HIV  follow-up visit. Doing well. \par On Genvoya and Valtrex and Vit D3. Doing well. Continued joint pains and numbness of hands and feet? Diagnosed HIV in . Needs to follow up with both neuro and rheum. \par up to date with gyn and mammo for  \par Had pfizer both doses same date as \par Plan 22:\par 1) labwork today see in 6 months, continue Genvoya and restart Valtrex  and continue Vit D3 now the 2000iu daily\par 2) needs GI ( father had colon ca) needs ebval if needing colonoscopy before age 50\par 3) Needs a PCP \par \par  \par  History of Present Illness\par 42 year old female. English and Frisian speaking. Diagnosed 24 tears ago. Contracted HIV from her first  who  from HIV in .  second  in  and transmitted hiv to her second . Was originally treated for HIV at [facility] in [place], then was at [facility] with Dr Tracie Euceda, Decided to change over. Taking Genvoya for past 7 months started 2016. Doing well. Takes only Genvoya and valtrex for herpetic outbreaks. Recently states seen in ER for herniated cervical disc, pain to left arm. \par medical hx: HIV, was Hep C positive positive treated in , was treated for hep C first 17 years ago. Hep C now resolved. Surgery- only eye surgery. \par Both parent alive- father vision and hearing. Mother 75 year old.-Thyroid, deminia, high cholesterol, osteoposis, parkinsons. \par \par \par Sexual History: The patient is sexually active. The patient is for some encounters. She is currently sexually active with male partner(s). \par STI, Dates, Treatment: herpes, vatrex. \par Travel: yes. \par Occupation: no, not at present. \par Lives with . \par Who is aware of HIV status: yes. \par  \par Social History\par no drinking or smoking \par \par Current Meds\par Genvoya and valtrex ( 500mg daily) supressive. \par \par  \par Active Problems\par Flu vaccine need (V04.81) (Z23)\par Folliculitis (704.8) (L73.9)\par Headache (784.0) (R51)\par Herpes, genital (054.10) (A60.00)\par HIV disease (042) (B20)\par Joint pain (719.40) (M25.50)\par Missed menses (626.4) (N92.6)\par Other herniation of intervertebral disc of cervical spine (722.0) (M50.20)\par Polyarthritis (716.50) (M13.0)\par Seasonal allergic rhinitis, unspecified chronicity, unspecified trigger\par Sore throat (462) (J02.9)\par Stress incontinence, female (625.6) (N39.3)\par Varicose veins of both lower extremities with pain (454.8) (I83.813)\par \par Past Medical History\par History of hepatitis C virus infection (V12.09) (Z86.19)\par History of Visit for dental examination (V72.2) (Z01.20)\par History of Visit for eye and vision exam (V72.0) (Z01.00)\par History of Visit for gynecologic examination (V72.31) (Z01.419)\par \par Current Meds\par pt stopped Gabapentin 100 MG Oral Capsule; 1 tablet at bed time, if no side effects every 3 days can\par increase bytablets until 300 mg\par Genvoya [id number] MG Oral Tablet; TAKE ONE TABLET BY MOUTH ONCE A DAY\par valACYclovir HCl - 500 MG Oral Tablet; TAKE ONE TABLET BY MOUTH ONCE A DAY\par Vitamin D High Potency 25 MCG (1000 UT) Oral Capsule; TAKE ONE CAPSULE BY\par MOUTH ONCE A DAY\par Vitamin D3 25 MCG (1000 UT) Oral Tablet; TAKE 1 TABLET DAILY\par \par Allergies\par No Known Drug Allergies\par Fish\par Shellfish\par

## 2022-04-12 NOTE — PHYSICAL EXAM
[General Appearance - Well Nourished] : well nourished [PERRL With Normal Accommodation] : pupils were equal in size, round, reactive to light [Neck Appearance] : the appearance of the neck was normal [Neck Cervical Mass (___cm)] : no neck mass was observed [Jugular Venous Distention Increased] : there was no jugular-venous distention [Thyroid Diffuse Enlargement] : the thyroid was not enlarged [Auscultation Breath Sounds / Voice Sounds] : lungs were clear to auscultation bilaterally [Heart Rate And Rhythm] : heart rate was normal and rhythm regular [Heart Sounds] : normal S1 and S2 [Heart Sounds Gallop] : no gallops [Murmurs] : no murmurs [Heart Sounds Pericardial Friction Rub] : no pericardial rub [Full Pulse] : the pedal pulses are present [Edema] : there was no peripheral edema [Bowel Sounds] : normal bowel sounds [Abdomen Soft] : soft [Abdomen Tenderness] : non-tender [Abdomen Mass (___ Cm)] : no abdominal mass palpated [Costovertebral Angle Tenderness] : no CVA tenderness [No Palpable Adenopathy] : no palpable adenopathy [Musculoskeletal - Swelling] : no joint swelling [Nail Clubbing] : no clubbing  or cyanosis of the fingernails [Motor Tone] : muscle strength and tone were normal [Skin Color & Pigmentation] : normal skin color and pigmentation [] : no rash [Oriented To Time, Place, And Person] : oriented to person, place, and time [Affect] : the affect was normal [FreeTextEntry1] : throat pain, reddened tonsils.

## 2022-04-13 LAB
25(OH)D3 SERPL-MCNC: 25.2 NG/ML
ALBUMIN SERPL ELPH-MCNC: 4.2 G/DL
ALP BLD-CCNC: 86 U/L
ALT SERPL-CCNC: 8 U/L
ANION GAP SERPL CALC-SCNC: 9 MMOL/L
APPEARANCE: CLEAR
AST SERPL-CCNC: 13 U/L
BACTERIA: NEGATIVE
BASOPHILS # BLD AUTO: 0.04 K/UL
BASOPHILS NFR BLD AUTO: 0.8 %
BILIRUB SERPL-MCNC: 0.2 MG/DL
BILIRUBIN URINE: NEGATIVE
BLOOD URINE: NEGATIVE
BUN SERPL-MCNC: 15 MG/DL
CALCIUM SERPL-MCNC: 9.2 MG/DL
CD3 CELLS # BLD: 969 /UL
CD3 CELLS NFR BLD: 68 %
CD3+CD4+ CELLS # BLD: 509 /UL
CD3+CD4+ CELLS NFR BLD: 36 %
CD3+CD4+ CELLS/CD3+CD8+ CLL SPEC: 1.14 RATIO
CD3+CD8+ CELLS # SPEC: 447 /UL
CD3+CD8+ CELLS NFR BLD: 32 %
CHLORIDE SERPL-SCNC: 106 MMOL/L
CHOLEST SERPL-MCNC: 190 MG/DL
CO2 SERPL-SCNC: 26 MMOL/L
COLOR: NORMAL
CREAT SERPL-MCNC: 0.68 MG/DL
EGFR: 109 ML/MIN/1.73M2
EOSINOPHIL # BLD AUTO: 0.06 K/UL
EOSINOPHIL NFR BLD AUTO: 1.1 %
ESTIMATED AVERAGE GLUCOSE: 108 MG/DL
GLUCOSE QUALITATIVE U: NEGATIVE
GLUCOSE SERPL-MCNC: 92 MG/DL
HBA1C MFR BLD HPLC: 5.4 %
HBV SURFACE AG SER QL: NONREACTIVE
HCT VFR BLD CALC: 39.3 %
HCV RNA SERPL NAA+PROBE-LOG IU: NOT DETECTED LOGIU/ML
HDLC SERPL-MCNC: 67 MG/DL
HEPC RNA INTERP: NOT DETECTED
HGB BLD-MCNC: 12.6 G/DL
HIV1 RNA # SERPL NAA+PROBE: NORMAL
HIV1 RNA # SERPL NAA+PROBE: NORMAL COPIES/ML
HYALINE CASTS: 2 /LPF
IMM GRANULOCYTES NFR BLD AUTO: 0.4 %
KETONES URINE: NEGATIVE
LDLC SERPL CALC-MCNC: 109 MG/DL
LEUKOCYTE ESTERASE URINE: ABNORMAL
LYMPHOCYTES # BLD AUTO: 1.55 K/UL
LYMPHOCYTES NFR BLD AUTO: 29.4 %
MAN DIFF?: NORMAL
MCHC RBC-ENTMCNC: 31.2 PG
MCHC RBC-ENTMCNC: 32.1 GM/DL
MCV RBC AUTO: 97.3 FL
MICROSCOPIC-UA: NORMAL
MONOCYTES # BLD AUTO: 0.26 K/UL
MONOCYTES NFR BLD AUTO: 4.9 %
NEUTROPHILS # BLD AUTO: 3.34 K/UL
NEUTROPHILS NFR BLD AUTO: 63.4 %
NITRITE URINE: NEGATIVE
NONHDLC SERPL-MCNC: 123 MG/DL
PH URINE: 6
PLATELET # BLD AUTO: 330 K/UL
POTASSIUM SERPL-SCNC: 4.3 MMOL/L
PROT SERPL-MCNC: 7.5 G/DL
PROTEIN URINE: NEGATIVE
RBC # BLD: 4.04 M/UL
RBC # FLD: 13.4 %
RED BLOOD CELLS URINE: 2 /HPF
SODIUM SERPL-SCNC: 141 MMOL/L
SPECIFIC GRAVITY URINE: 1.02
SQUAMOUS EPITHELIAL CELLS: 2 /HPF
TRIGL SERPL-MCNC: 71 MG/DL
TSH SERPL-ACNC: 1.58 UIU/ML
UROBILINOGEN URINE: NORMAL
VIRAL LOAD INTERP: NORMAL
VIRAL LOAD LOG: NORMAL LG COP/ML
WBC # FLD AUTO: 5.27 K/UL
WHITE BLOOD CELLS URINE: 5 /HPF

## 2022-04-18 ENCOUNTER — RX RENEWAL (OUTPATIENT)
Age: 47
End: 2022-04-18

## 2022-07-11 ENCOUNTER — RX RENEWAL (OUTPATIENT)
Age: 47
End: 2022-07-11

## 2022-08-30 ENCOUNTER — APPOINTMENT (OUTPATIENT)
Dept: NEUROLOGY | Facility: CLINIC | Age: 47
End: 2022-08-30

## 2022-09-06 ENCOUNTER — NON-APPOINTMENT (OUTPATIENT)
Age: 47
End: 2022-09-06

## 2022-10-12 ENCOUNTER — APPOINTMENT (OUTPATIENT)
Dept: INFECTIOUS DISEASE | Facility: CLINIC | Age: 47
End: 2022-10-12

## 2022-10-19 ENCOUNTER — NON-APPOINTMENT (OUTPATIENT)
Age: 47
End: 2022-10-19

## 2022-10-31 ENCOUNTER — APPOINTMENT (OUTPATIENT)
Dept: INFECTIOUS DISEASE | Facility: CLINIC | Age: 47
End: 2022-10-31

## 2022-10-31 VITALS
DIASTOLIC BLOOD PRESSURE: 73 MMHG | HEART RATE: 80 BPM | HEIGHT: 60 IN | OXYGEN SATURATION: 97 % | BODY MASS INDEX: 28.66 KG/M2 | WEIGHT: 146 LBS | TEMPERATURE: 97.6 F | SYSTOLIC BLOOD PRESSURE: 91 MMHG

## 2022-10-31 DIAGNOSIS — D22.9 MELANOCYTIC NEVI, UNSPECIFIED: ICD-10-CM

## 2022-10-31 LAB
BASOPHILS # BLD AUTO: 0.05 K/UL
BASOPHILS NFR BLD AUTO: 0.9 %
EOSINOPHIL # BLD AUTO: 0.1 K/UL
EOSINOPHIL NFR BLD AUTO: 1.7 %
HCT VFR BLD CALC: 38.6 %
HGB BLD-MCNC: 12.4 G/DL
IMM GRANULOCYTES NFR BLD AUTO: 0.3 %
LYMPHOCYTES # BLD AUTO: 1.7 K/UL
LYMPHOCYTES NFR BLD AUTO: 29.2 %
MAN DIFF?: NORMAL
MCHC RBC-ENTMCNC: 30.8 PG
MCHC RBC-ENTMCNC: 32.1 GM/DL
MCV RBC AUTO: 95.8 FL
MONOCYTES # BLD AUTO: 0.41 K/UL
MONOCYTES NFR BLD AUTO: 7 %
NEUTROPHILS # BLD AUTO: 3.54 K/UL
NEUTROPHILS NFR BLD AUTO: 60.9 %
PLATELET # BLD AUTO: 337 K/UL
RBC # BLD: 4.03 M/UL
RBC # FLD: 13.2 %
WBC # FLD AUTO: 5.82 K/UL

## 2022-10-31 PROCEDURE — 99213 OFFICE O/P EST LOW 20 MIN: CPT

## 2022-10-31 NOTE — HISTORY OF PRESENT ILLNESS
[FreeTextEntry1] : 10/31/2022\par JUSTUS LYNN is a 47 year old female being seen for a HIV follow-up visit. Doing well. \par On Genvoya and Valtrex and Vit D3. Doing well. Continued joint pains and numbness of hands and feet? Diagnosed HIV in . Needs to follow up with both neuro and rheum. \par up to date with gyn and mammo for  \par Had pfizer both doses same date as \par non drinker , non smoker\par father had colon cancer\par Plan 10/31/22:\par 1) labwork today and  see in 6 months, continue Genvoya and restart Valtrex and continue Vit D3 now the 2000iu daily\par 2) needs GI ( father had colon ca) needs if needs colonoscopy\par 3) Needs GYN ,m mammogram and ortho for left knee\par \par  \par  History of Present Illness\par 42 year old female. English and Syrian speaking. Diagnosed 24 tears ago. Contracted HIV from her first  who  from HIV in .  second  in  and transmitted hiv to her second . Was originally treated for HIV at [facility] in [place], then was at [facility] with Dr Tracie Euceda, Decided to change over. Taking Genvoya for past 7 months started 2016. Doing well. Takes only Genvoya and valtrex for herpetic outbreaks. Recently states seen in ER for herniated cervical disc, pain to left arm. \par medical hx: HIV, was Hep C positive positive treated in , was treated for hep C first 17 years ago. Hep C now resolved. Surgery- only eye surgery. \par Both parent alive- father vision and hearing. Mother 75 year old.-Thyroid, deminia, high cholesterol, osteoposis, parkinsons. \par \par \par Sexual History: The patient is sexually active. The patient is for some encounters. She is currently sexually active with male partner(s). \par STI, Dates, Treatment: herpes, vatrex. \par Travel: yes. \par Occupation: no, not at present. \par Lives with . \par Who is aware of HIV status: yes. \par  \par Social History\par no drinking or smoking \par \par Current Meds\par Genvoya and valtrex ( 500mg daily) supressive. \par \par  \par Active Problems\par Flu vaccine need (V04.81) (Z23)\par Folliculitis (704.8) (L73.9)\par Headache (784.0) (R51)\par Herpes, genital (054.10) (A60.00)\par HIV disease (042) (B20)\par Joint pain (719.40) (M25.50)\par Missed menses (626.4) (N92.6)\par Other herniation of intervertebral disc of cervical spine (722.0) (M50.20)\par Polyarthritis (716.50) (M13.0)\par Seasonal allergic rhinitis, unspecified chronicity, unspecified trigger\par Sore throat (462) (J02.9)\par Stress incontinence, female (625.6) (N39.3)\par Varicose veins of both lower extremities with pain (454.8) (I83.813)\par \par Past Medical History\par History of hepatitis C virus infection (V12.09) (Z86.19)\par History of Visit for dental examination (V72.2) (Z01.20)\par History of Visit for eye and vision exam (V72.0) (Z01.00)\par History of Visit for gynecologic examination (V72.31) (Z01.419)\par \par Current Meds\par pt stopped Gabapentin 100 MG Oral Capsule; 1 tablet at bed time, if no side effects every 3 days can\par increase bytablets until 300 mg\par Genvoya [phone] MG Oral Tablet; TAKE ONE TABLET BY MOUTH ONCE A DAY\par valACYclovir HCl - 500 MG Oral Tablet; TAKE ONE TABLET BY MOUTH ONCE A DAY\par Vitamin D High Potency 25 MCG (1000 UT) Oral Capsule; TAKE ONE CAPSULE BY\par MOUTH ONCE A DAY\par Vitamin D3 25 MCG (1000 UT) Oral Tablet; TAKE 1 TABLET DAILY\par \par Allergies\par No Known Drug Allergies\par Fish\par Shellfish\par \par

## 2022-11-02 LAB
25(OH)D3 SERPL-MCNC: 27.6 NG/ML
ALBUMIN SERPL ELPH-MCNC: 4 G/DL
ALP BLD-CCNC: 94 U/L
ALT SERPL-CCNC: 10 U/L
ANION GAP SERPL CALC-SCNC: 12 MMOL/L
APPEARANCE: CLEAR
AST SERPL-CCNC: 18 U/L
BACTERIA: NEGATIVE
BILIRUB SERPL-MCNC: 0.2 MG/DL
BILIRUBIN URINE: NEGATIVE
BLOOD URINE: NEGATIVE
BUN SERPL-MCNC: 11 MG/DL
C TRACH RRNA SPEC QL NAA+PROBE: NOT DETECTED
CALCIUM SERPL-MCNC: 9.6 MG/DL
CD3 CELLS # BLD: 1164 /UL
CD3 CELLS NFR BLD: 70 %
CD3+CD4+ CELLS # BLD: 619 /UL
CD3+CD4+ CELLS NFR BLD: 37 %
CD3+CD4+ CELLS/CD3+CD8+ CLL SPEC: 1.15 RATIO
CD3+CD8+ CELLS # SPEC: 537 /UL
CD3+CD8+ CELLS NFR BLD: 32 %
CHLORIDE SERPL-SCNC: 107 MMOL/L
CHOLEST SERPL-MCNC: 199 MG/DL
CO2 SERPL-SCNC: 23 MMOL/L
COLOR: NORMAL
CREAT SERPL-MCNC: 0.67 MG/DL
EGFR: 108 ML/MIN/1.73M2
ESTIMATED AVERAGE GLUCOSE: 105 MG/DL
GLUCOSE QUALITATIVE U: NEGATIVE
GLUCOSE SERPL-MCNC: 90 MG/DL
HBA1C MFR BLD HPLC: 5.3 %
HDLC SERPL-MCNC: 68 MG/DL
HIV1 RNA # SERPL NAA+PROBE: NORMAL
HIV1 RNA # SERPL NAA+PROBE: NORMAL COPIES/ML
HYALINE CASTS: 1 /LPF
KETONES URINE: NEGATIVE
LDLC SERPL CALC-MCNC: 111 MG/DL
LEUKOCYTE ESTERASE URINE: NEGATIVE
M TB IFN-G BLD-IMP: NEGATIVE
MICROSCOPIC-UA: NORMAL
N GONORRHOEA RRNA SPEC QL NAA+PROBE: NOT DETECTED
NITRITE URINE: NEGATIVE
NONHDLC SERPL-MCNC: 131 MG/DL
PH URINE: 6
POTASSIUM SERPL-SCNC: 4.2 MMOL/L
PROT SERPL-MCNC: 7.4 G/DL
PROTEIN URINE: NEGATIVE
QUANTIFERON TB PLUS MITOGEN MINUS NIL: 5.08 IU/ML
QUANTIFERON TB PLUS NIL: 5.84 IU/ML
QUANTIFERON TB PLUS TB1 MINUS NIL: 1.35 IU/ML
QUANTIFERON TB PLUS TB2 MINUS NIL: 1.22 IU/ML
RED BLOOD CELLS URINE: 1 /HPF
SODIUM SERPL-SCNC: 141 MMOL/L
SOURCE AMPLIFICATION: NORMAL
SPECIFIC GRAVITY URINE: 1.02
SQUAMOUS EPITHELIAL CELLS: 2 /HPF
T PALLIDUM AB SER QL IA: NEGATIVE
TRIGL SERPL-MCNC: 101 MG/DL
TSH SERPL-ACNC: 1.32 UIU/ML
UROBILINOGEN URINE: NORMAL
VIRAL LOAD INTERP: NORMAL
VIRAL LOAD LOG: NORMAL LG COP/ML
WHITE BLOOD CELLS URINE: 2 /HPF

## 2022-11-15 ENCOUNTER — APPOINTMENT (OUTPATIENT)
Dept: ORTHOPEDIC SURGERY | Facility: CLINIC | Age: 47
End: 2022-11-15

## 2022-11-15 DIAGNOSIS — M75.82 OTHER SHOULDER LESIONS, LEFT SHOULDER: ICD-10-CM

## 2022-11-15 DIAGNOSIS — M25.562 PAIN IN LEFT KNEE: ICD-10-CM

## 2022-11-15 PROCEDURE — 99214 OFFICE O/P EST MOD 30 MIN: CPT

## 2022-11-15 NOTE — PHYSICAL EXAM
[de-identified] : General: No acute distress\par Mental: Alert and oriented x3\par Eyes: Conjunctivitis not seen\par Chest: Symmetric chest rise, no audible wheezing\par Skin: Bilateral lower extremities absent from rashes and ulcers\par Abdomen: No distention\par \par Spine: Mildly tender at the left paraspinal cervical muscles.  Nontender midline.  Negative Spurling sign.\par Right shoulder: Intact skin, nontender throughout.  Painless motion at the shoulder.  Forward flexion 170, external rotation 70, internal rotation thoracic spine.  5/5 strength throughout.  Distally motor and sensory intact.\par Left shoulder: Intact skin, mildly tender at the posterior lateral acromion.  Nontender scapular spine, nontender clavicle, nontender AC joint.  Forward flexion 170, external rotation 60, internal rotation thoracic spine.  4/5 strength to forward flexion, otherwise 5/5 strength.  Negative Cristy, negative Littlejohn, negative drop arm, negative Speed, negative Yergason, negative [place].  Distally motor and sensory intact.\par Left hand: No swelling, nontender to palpation.  Distally sensation intact throughout.  Negative Tinel at the carpal tunnel.\par \par Left knee:\par Skin: Clean, dry, intact \par Inspection: No obvious malalignment, no masses, no swelling, no effusion. \par Tenderness: + MJLT. no LJLT.  Peripatellar tenderness.  No tenderness over the medial and lateral patella facets. No ttp medial/lateral epicondyle, patella tendon, tibial tubercle, pes anserinus, or proximal fibula. \par ROM: 0 to 130° \par Stability: Stable to varus and valgus, negative lachman. Negative anterior/posterior drawer. \par Additional tests: Negative McMurrays test, negative Steinmann, Negative patellar grind test.  \par Strength: 5/5 Q/H/TA/GS/EHL, no atrophy \par Neuro: Sensation intact to light touch throughout in dp/sp/tib/jeffery/saph nerve distributions\par Pulses: 2+ DP/PT pulses.\par  [de-identified] : Left shoulder x-rays show normal alignment, well-maintained joint space.  No signs of degenerative disease.  There is calcification deposit at the greater tuberosity consistent with calcific tendinitis.\par Cervical spine x-rays demonstrate neutral alignment, maintained disc space height, no arthrosis, slight anterolisthesis of C4 on C5.\par Left knee x-rays show neutral alignment, maintained joint space medially and laterally, no osteophytes or degenerative disease.  No fractures or dislocations.  Slight narrowing of the patellofemoral joint space.  Kellgren-Julio 1

## 2022-11-15 NOTE — HISTORY OF PRESENT ILLNESS
[de-identified] : The patient is a 47-year-old right-hand-dominant female presenting with multiple locations of pain.  Left shoulder pain of approximately 4 years although improving recently.  No injury or inciting event.  She developed frozen shoulder prior to the pandemic and had significant difficulty with range of motion and pain.  Over time it gradually improved and she has been going to physical therapy for the past 3 months.  She reports 80% improvement however still has pain when the arm is in abduction and external rotation.  No difficulty reaching across, reaching overhead, or lifting weights.  The pain is lateral, no clicking, no grinding, no numbness or tingling.  She does occasionally have left hand pain in the first webspace and has seen a hand surgeon in the past.  She was diagnosed with carpal tunnel and de Quervain's tenosynovitis, and this improved with a hand brace.  Regarding the shoulder, there is also associated left-sided cervical paraspinal pain that radiates towards midline.  She occasionally takes Tylenol for these issues.  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  No history of shoulder injections or surgery.\par Also has left knee pain for approximately 7 years, located medially and anteriorly.  No radiation proximal or distal.  Denies any clicking, swelling, buckling, catching, loss of motion.  No injury or inciting event.  No limitation to functional activities.  She reports a knee injection in the past that did not help.  Denies numbness tingling.

## 2022-11-15 NOTE — REASON FOR VISIT
[Initial Visit] : an initial visit for [FreeTextEntry2] : Left shoulder pain, left knee pain, neck pain

## 2022-11-15 NOTE — DISCUSSION/SUMMARY
[de-identified] : 47-year-old female with multiple sites of pain including left shoulder, cervical spine, left knee, and left hand.\par Left shoulder: Resolving adhesive capsulitis, now with calcific tendinitis.  Recommend course of anti-inflammatory medication and side effects reviewed.  New prescription for physical therapy given.\par Cervical spine: Likely paraspinal muscle pain, no concerning finding on x-ray.  Anti-inflammatory medication recommended.\par Left knee: Chondromalacia and possible meniscus injury.  Prescription for physical therapy given.  Home exercise program with knee conditioning exercises given.  Recommend course of anti-inflammatory medication as above.\par Left hand: Possible tendinitis and recurrence of carpal tunnel symptoms.  Follow-up with hand surgeon.\par Follow-up in 6 months.

## 2022-11-22 ENCOUNTER — APPOINTMENT (OUTPATIENT)
Dept: INFECTIOUS DISEASE | Facility: CLINIC | Age: 47
End: 2022-11-22

## 2023-02-02 ENCOUNTER — APPOINTMENT (OUTPATIENT)
Dept: ORTHOPEDIC SURGERY | Facility: CLINIC | Age: 48
End: 2023-02-02
Payer: MEDICAID

## 2023-02-02 VITALS — HEIGHT: 64 IN | HEART RATE: 77 BPM | SYSTOLIC BLOOD PRESSURE: 103 MMHG | DIASTOLIC BLOOD PRESSURE: 72 MMHG

## 2023-02-02 DIAGNOSIS — M79.672 PAIN IN LEFT FOOT: ICD-10-CM

## 2023-02-02 DIAGNOSIS — M62.89 OTHER SPECIFIED DISORDERS OF MUSCLE: ICD-10-CM

## 2023-02-02 PROCEDURE — 99214 OFFICE O/P EST MOD 30 MIN: CPT

## 2023-02-02 PROCEDURE — 73630 X-RAY EXAM OF FOOT: CPT | Mod: LT

## 2023-02-05 PROBLEM — M62.89 TIGHTNESS OF BOTH GASTROCNEMIUS MUSCLES: Status: ACTIVE | Noted: 2023-02-05

## 2023-03-24 ENCOUNTER — NON-APPOINTMENT (OUTPATIENT)
Age: 48
End: 2023-03-24

## 2023-03-24 ENCOUNTER — APPOINTMENT (OUTPATIENT)
Dept: ORTHOPEDIC SURGERY | Facility: CLINIC | Age: 48
End: 2023-03-24
Payer: MEDICAID

## 2023-03-24 VITALS — SYSTOLIC BLOOD PRESSURE: 86 MMHG | HEART RATE: 83 BPM | DIASTOLIC BLOOD PRESSURE: 59 MMHG

## 2023-03-24 VITALS — HEIGHT: 64 IN

## 2023-03-24 DIAGNOSIS — M25.872 OTHER SPECIFIED JOINT DISORDERS, LEFT ANKLE AND FOOT: ICD-10-CM

## 2023-03-24 DIAGNOSIS — M72.2 PLANTAR FASCIAL FIBROMATOSIS: ICD-10-CM

## 2023-03-24 DIAGNOSIS — M77.8 OTHER ENTHESOPATHIES, NOT ELSEWHERE CLASSIFIED: ICD-10-CM

## 2023-03-24 DIAGNOSIS — M21.6X2 OTHER ACQUIRED DEFORMITIES OF LEFT FOOT: ICD-10-CM

## 2023-03-24 PROCEDURE — 99213 OFFICE O/P EST LOW 20 MIN: CPT

## 2023-03-26 PROBLEM — M25.872 SESAMOIDITIS OF LEFT FOOT: Status: ACTIVE | Noted: 2023-02-05

## 2023-03-26 PROBLEM — M72.2 PLANTAR FASCIITIS OF LEFT FOOT: Status: ACTIVE | Noted: 2023-02-05

## 2023-03-26 PROBLEM — M21.6X2 GASTROCNEMIUS EQUINUS OF LEFT LOWER EXTREMITY: Status: ACTIVE | Noted: 2023-02-05

## 2023-03-26 PROBLEM — M77.8 ENTHESOPATHY OF FOOT: Status: ACTIVE | Noted: 2023-02-05

## 2023-05-01 ENCOUNTER — APPOINTMENT (OUTPATIENT)
Dept: INFECTIOUS DISEASE | Facility: CLINIC | Age: 48
End: 2023-05-01
Payer: MEDICAID

## 2023-05-01 ENCOUNTER — LABORATORY RESULT (OUTPATIENT)
Age: 48
End: 2023-05-01

## 2023-05-01 VITALS
SYSTOLIC BLOOD PRESSURE: 102 MMHG | HEIGHT: 64 IN | OXYGEN SATURATION: 99 % | DIASTOLIC BLOOD PRESSURE: 71 MMHG | HEART RATE: 88 BPM | TEMPERATURE: 98.3 F

## 2023-05-01 LAB
25(OH)D3 SERPL-MCNC: 35 NG/ML
ALBUMIN SERPL ELPH-MCNC: 4.3 G/DL
ALP BLD-CCNC: 100 U/L
ALT SERPL-CCNC: 19 U/L
ANION GAP SERPL CALC-SCNC: 9 MMOL/L
AST SERPL-CCNC: 19 U/L
BILIRUB SERPL-MCNC: 0.3 MG/DL
BUN SERPL-MCNC: 15 MG/DL
CALCIUM SERPL-MCNC: 9.7 MG/DL
CHLORIDE SERPL-SCNC: 105 MMOL/L
CHOLEST SERPL-MCNC: 202 MG/DL
CO2 SERPL-SCNC: 28 MMOL/L
CREAT SERPL-MCNC: 0.72 MG/DL
EGFR: 104 ML/MIN/1.73M2
GLUCOSE SERPL-MCNC: 79 MG/DL
HCT VFR BLD CALC: 39.7 %
HDLC SERPL-MCNC: 74 MG/DL
HGB BLD-MCNC: 13.3 G/DL
LDLC SERPL CALC-MCNC: 111 MG/DL
MCHC RBC-ENTMCNC: 32.2 PG
MCHC RBC-ENTMCNC: 33.5 GM/DL
MCV RBC AUTO: 96.1 FL
NONHDLC SERPL-MCNC: 128 MG/DL
PLATELET # BLD AUTO: 335 K/UL
POTASSIUM SERPL-SCNC: 4.1 MMOL/L
PROT SERPL-MCNC: 7 G/DL
RBC # BLD: 4.13 M/UL
RBC # FLD: 13.2 %
SODIUM SERPL-SCNC: 142 MMOL/L
TRIGL SERPL-MCNC: 84 MG/DL
WBC # FLD AUTO: 5.56 K/UL

## 2023-05-01 PROCEDURE — 99213 OFFICE O/P EST LOW 20 MIN: CPT

## 2023-05-01 NOTE — HISTORY OF PRESENT ILLNESS
[FreeTextEntry1] : 23\par JUSTUS LYNN is a 47 year old female being seen for a HIV follow-up visit. Doing well. \par On Genvoya and Valtrex and Vit D3. Doing well. Continued joint pains and numbness of hands and feet? Diagnosed HIV in . Needs to follow up with both neuro and rheum. \par up to date with gyn and mammo for  \par Had pfizer both doses same date as \par non drinker , non smoker\par father had colon cancer\par up to date with GYN and mammogram for \par \par Plan 23:\par 1) labwork today and see in 6 months, continue Genvoya and restart Valtrex and continue Vit D3 now the 2000iu daily\par 2) needs GI ( father had colon ca) needs if needs colonoscopy\par 3) Needs stool samples and bone density test. \par \par  \par  History of Present Illness\par 42 year old female. English and Pryde[place] speaking. Diagnosed 24 tears ago. Contracted HIV from her first  who  from HIV in .  second  in  and transmitted hiv to her second . Was originally treated for HIV at [facility] in [place], then was at [facility] with Dr Tracie Euceda, Decided to change over. Taking Genvoya for past 7 months started 2016. Doing well. Takes only Genvoya and valtrex for herpetic outbreaks. Recently states seen in ER for herniated cervical disc, pain to left arm. \par medical hx: HIV, was Hep C positive positive treated in , was treated for hep C first 17 years ago. Hep C now resolved. Surgery- only eye surgery. \par Both parent alive- father vision and hearing. Mother 75 year old.-Thyroid, deminia, high cholesterol, osteoposis, parkinsons. \par \par \par Sexual History: The patient is sexually active. The patient is for some encounters. She is currently sexually active with male partner(s). \par STI, Dates, Treatment: herpes, vatrex. \par Travel: yes. \par Occupation: no, not at present. \par Lives with . \par Who is aware of HIV status: yes. \par  \par Social History\par no drinking or smoking \par \par Current Meds\par Genvoya and valtrex ( 500mg daily) supressive. \par \par  \par Active Problems\par Flu vaccine need (V04.81) (Z23)\par Folliculitis (704.8) (L73.9)\par Headache (784.0) (R51)\par Herpes, genital (054.10) (A60.00)\par HIV disease (042) (B20)\par Joint pain (719.40) (M25.50)\par Missed menses (626.4) (N92.6)\par Other herniation of intervertebral disc of cervical spine (722.0) (M50.20)\par Polyarthritis (716.50) (M13.0)\par Seasonal allergic rhinitis, unspecified chronicity, unspecified trigger\par Sore throat (462) (J02.9)\par Stress incontinence, female (625.6) (N39.3)\par Varicose veins of both lower extremities with pain (454.8) (I83.813)\par \par Past Medical History\par History of hepatitis C virus infection (V12.09) (Z86.19)\par History of Visit for dental examination (V72.2) (Z01.20)\par History of Visit for eye and vision exam (V72.0) (Z01.00)\par History of Visit for gynecologic examination (V72.31) (Z01.419)\par \par Current Meds\par pt stopped Gabapentin 100 MG Oral Capsule; 1 tablet at bed time, if no side effects every 3 days can\par increase bytablets until 300 mg\par Genvoya [phone] MG Oral Tablet; TAKE ONE TABLET BY MOUTH ONCE A DAY\par valACYclovir HCl - 500 MG Oral Tablet; TAKE ONE TABLET BY MOUTH ONCE A DAY\par Vitamin D High Potency 25 MCG (1000 UT) Oral Capsule; TAKE ONE CAPSULE BY\par MOUTH ONCE A DAY\par Vitamin D3 25 MCG (1000 UT) Oral Tablet; TAKE 1 TABLET DAILY\par \par Allergies\par No Known Drug Allergies\par Fish\par Shellfish\par \par

## 2023-05-02 ENCOUNTER — NON-APPOINTMENT (OUTPATIENT)
Age: 48
End: 2023-05-02

## 2023-05-02 LAB
APPEARANCE: CLEAR
BACTERIA: NEGATIVE /HPF
BILIRUBIN URINE: NEGATIVE
BLOOD URINE: NEGATIVE
C TRACH RRNA SPEC QL NAA+PROBE: NOT DETECTED
CAST: 1 /LPF
CD3 CELLS # BLD: 1148 CELLS/UL
CD3 CELLS NFR BLD: 67 %
CD3+CD4+ CELLS # BLD: 607 CELLS/UL
CD3+CD4+ CELLS NFR BLD: 35 %
CD3+CD4+ CELLS/CD3+CD8+ CLL SPEC: 1.13 RATIO
CD3+CD8+ CELLS # SPEC: 535 CELLS/UL
CD3+CD8+ CELLS NFR BLD: 31 %
COLOR: YELLOW
E. COLI O157: DETECTED
ENTEROTOXIGENIC E.COLI (ETEC) LT/ST: DETECTED
EPITHELIAL CELLS: 5 /HPF
ESTIMATED AVERAGE GLUCOSE: 105 MG/DL
GI PCR PANEL: DETECTED
GLUCOSE QUALITATIVE U: NEGATIVE MG/DL
HBA1C MFR BLD HPLC: 5.3 %
HIV1 RNA # SERPL NAA+PROBE: NORMAL
HIV1 RNA # SERPL NAA+PROBE: NORMAL COPIES/ML
KETONES URINE: NEGATIVE MG/DL
LEUKOCYTE ESTERASE URINE: NEGATIVE
MICROSCOPIC-UA: NORMAL
N GONORRHOEA RRNA SPEC QL NAA+PROBE: NOT DETECTED
NITRITE URINE: NEGATIVE
PH URINE: 8
PROTEIN URINE: NEGATIVE MG/DL
RED BLOOD CELLS URINE: 1 /HPF
SHIGA-LIKE TOXIN-PRODUCING E.COLI (STEC) STX1/STX2: DETECTED
SOURCE AMPLIFICATION: NORMAL
SPECIFIC GRAVITY URINE: 1.02
T PALLIDUM AB SER QL IA: NEGATIVE
UROBILINOGEN URINE: 0.2 MG/DL
VIRAL LOAD INTERP: NORMAL
VIRAL LOAD LOG: NORMAL LG COP/ML
WHITE BLOOD CELLS URINE: 0 /HPF

## 2023-05-16 ENCOUNTER — APPOINTMENT (OUTPATIENT)
Dept: ORTHOPEDIC SURGERY | Facility: CLINIC | Age: 48
End: 2023-05-16

## 2023-06-29 ENCOUNTER — APPOINTMENT (OUTPATIENT)
Dept: ORTHOPEDIC SURGERY | Facility: CLINIC | Age: 48
End: 2023-06-29

## 2023-10-04 ENCOUNTER — NON-APPOINTMENT (OUTPATIENT)
Age: 48
End: 2023-10-04

## 2023-11-05 ENCOUNTER — RX RENEWAL (OUTPATIENT)
Age: 48
End: 2023-11-05

## 2023-12-06 ENCOUNTER — RX RENEWAL (OUTPATIENT)
Age: 48
End: 2023-12-06

## 2023-12-15 PROBLEM — Z80.0 FAMILY HISTORY OF MALIGNANT NEOPLASM OF COLON: Status: ACTIVE | Noted: 2023-12-15

## 2023-12-15 PROBLEM — M13.0 POLYARTHRITIS: Status: ACTIVE | Noted: 2019-07-23

## 2023-12-15 PROBLEM — G62.9 NEUROPATHY: Status: ACTIVE | Noted: 2021-04-13

## 2023-12-19 ENCOUNTER — APPOINTMENT (OUTPATIENT)
Dept: GASTROENTEROLOGY | Facility: CLINIC | Age: 48
End: 2023-12-19
Payer: MEDICAID

## 2023-12-19 VITALS
WEIGHT: 144 LBS | SYSTOLIC BLOOD PRESSURE: 88 MMHG | DIASTOLIC BLOOD PRESSURE: 64 MMHG | OXYGEN SATURATION: 100 % | HEIGHT: 64 IN | HEART RATE: 70 BPM | BODY MASS INDEX: 24.59 KG/M2

## 2023-12-19 VITALS
WEIGHT: 144 LBS | SYSTOLIC BLOOD PRESSURE: 90 MMHG | RESPIRATION RATE: 14 BRPM | BODY MASS INDEX: 24.59 KG/M2 | OXYGEN SATURATION: 98 % | HEART RATE: 78 BPM | HEIGHT: 64 IN | DIASTOLIC BLOOD PRESSURE: 60 MMHG

## 2023-12-19 DIAGNOSIS — Z12.12 ENCOUNTER FOR SCREENING FOR MALIGNANT NEOPLASM OF COLON: ICD-10-CM

## 2023-12-19 DIAGNOSIS — Z12.11 ENCOUNTER FOR SCREENING FOR MALIGNANT NEOPLASM OF COLON: ICD-10-CM

## 2023-12-19 DIAGNOSIS — G62.9 POLYNEUROPATHY, UNSPECIFIED: ICD-10-CM

## 2023-12-19 DIAGNOSIS — Z80.0 FAMILY HISTORY OF MALIGNANT NEOPLASM OF DIGESTIVE ORGANS: ICD-10-CM

## 2023-12-19 DIAGNOSIS — M13.0 POLYARTHRITIS, UNSPECIFIED: ICD-10-CM

## 2023-12-19 PROCEDURE — 99204 OFFICE O/P NEW MOD 45 MIN: CPT

## 2023-12-19 NOTE — ASSESSMENT
[FreeTextEntry1] : IMPRESSION: #  Due for a screening test for colon polyps/early stage colon cancer -  Never had a colonoscopy.  #  Family history of colon cancer -  Father had colon cancer at age 44 and  from liver cancer in 80s.  -  Maternal grandmother had cancer of unknown origin in her 40s  #  Comorbidities:   HIV diagnosed in  on Genvoya, Hep C positive treated in  with SVR   PLAN:  We discussed colorectal cancer in the [place].   Colorectal cancer is the 3rd most common cause of cancer for men and woman, 2nd most common cause of cancer related deaths in the [place].  By 2030, colorectal cancer will be the leading cause of cancer related death in age 20 to 49.   We reviewed risk factors for colon cancer which include age, personal history of colon polyps, having a family Hx of colon cancer, cancer syndromes, personal Hx of inflammatory disease, having comorbidities such as obesity, having nicotine addiction, alcohol addiction,etc.    Screening for colorectal cancer should begin at age 45 for average risk individuals (and earlier for high risk groups).  This patient is HIGH RISK for colorectal cancer.  I have advised the patient to arrange for a colonoscopy to rule out colon polyps, colorectal cancer etc. under monitored anesthesia care.  Risks such as perforation requiring surgery, colostomy, bleeding requiring blood transfusion, infection/sepsis, diverticulitis, colitis, missed colon cancer (2% to 6%), internal organ injury such as splenic hemorrhage (6000, risk thin, female gender) etc, adverse reaction to medication etc. and risks of anesthesia including cardiopulmonary compromise requiring ICU care were discussed with patient.  Alternatives were discussed (stool test, CT colonography etc).  Patient verbalized understanding and agrees to proceed with a colonoscopy under anesthesia.  Discussed genetic testing - contact information.

## 2023-12-19 NOTE — HISTORY OF PRESENT ILLNESS
[FreeTextEntry1] : 47 y/o mother of two with hx of HIV diagnosed in  on Genvoya, Hep C positive treated in 2014 with SVR who presents for a screening colonoscopy.   Never had a colonoscopy.  Says she feels "excellent."  Patient denies having abdominal pain, constipation, diarrhea, loss of appetite, weight loss, melena and hematochezia.  Patient denies having heartburn, regurgitation, difficulty swallowing, painful swallowing, nausea, vomiting.  Father had colon cancer at age 44 and  from liver cancer in 80s.  Maternal grandmother had cancer of unknown origin in her 40s.  Mother had bladder cancer and  last year.  Patient denies family history of other gastrointestinal cancers.  All other review of systems are negative.  Denies cardiac symptoms.

## 2024-01-15 NOTE — PHYSICAL EXAM
Received refill request for Amlodipine 2.5 mg  Request is approved  because refill protocol Not met approval criteria  LOV and or labs were verified to not be correct  Refill sent to pharmacy    90 day courtesy refill     eGFR within last 12 months looking at last value    Last BP was under 140/90 or if patient has diabetes, CAD, or PVD, BP under 130/80 in past year       [General Appearance - Well Nourished] : well nourished [PERRL With Normal Accommodation] : pupils were equal in size, round, reactive to light [FreeTextEntry1] : throat pain, reddened tonsils.  [Neck Appearance] : the appearance of the neck was normal [Neck Cervical Mass (___cm)] : no neck mass was observed [Jugular Venous Distention Increased] : there was no jugular-venous distention [Thyroid Diffuse Enlargement] : the thyroid was not enlarged [Auscultation Breath Sounds / Voice Sounds] : lungs were clear to auscultation bilaterally [Heart Rate And Rhythm] : heart rate was normal and rhythm regular [Heart Sounds] : normal S1 and S2 [Heart Sounds Gallop] : no gallops [Murmurs] : no murmurs [Heart Sounds Pericardial Friction Rub] : no pericardial rub [Full Pulse] : the pedal pulses are present [Edema] : there was no peripheral edema [Bowel Sounds] : normal bowel sounds [Abdomen Soft] : soft [Abdomen Tenderness] : non-tender [Abdomen Mass (___ Cm)] : no abdominal mass palpated [Costovertebral Angle Tenderness] : no CVA tenderness [No Palpable Adenopathy] : no palpable adenopathy [Musculoskeletal - Swelling] : no joint swelling [Nail Clubbing] : no clubbing  or cyanosis of the fingernails [Motor Tone] : muscle strength and tone were normal [Skin Color & Pigmentation] : normal skin color and pigmentation [] : no rash [Oriented To Time, Place, And Person] : oriented to person, place, and time [Affect] : the affect was normal

## 2024-03-11 ENCOUNTER — APPOINTMENT (OUTPATIENT)
Dept: GASTROENTEROLOGY | Facility: HOSPITAL | Age: 49
End: 2024-03-11

## 2024-03-28 ENCOUNTER — RX RENEWAL (OUTPATIENT)
Age: 49
End: 2024-03-28

## 2024-03-28 ENCOUNTER — NON-APPOINTMENT (OUTPATIENT)
Age: 49
End: 2024-03-28

## 2024-03-29 ENCOUNTER — APPOINTMENT (OUTPATIENT)
Dept: INFECTIOUS DISEASE | Facility: CLINIC | Age: 49
End: 2024-03-29
Payer: MEDICAID

## 2024-03-29 VITALS
BODY MASS INDEX: 24.82 KG/M2 | OXYGEN SATURATION: 98 % | TEMPERATURE: 97.3 F | WEIGHT: 145.38 LBS | SYSTOLIC BLOOD PRESSURE: 103 MMHG | HEIGHT: 64 IN | HEART RATE: 76 BPM | DIASTOLIC BLOOD PRESSURE: 70 MMHG

## 2024-03-29 DIAGNOSIS — H61.20 IMPACTED CERUMEN, UNSPECIFIED EAR: ICD-10-CM

## 2024-03-29 DIAGNOSIS — B20 HUMAN IMMUNODEFICIENCY VIRUS [HIV] DISEASE: ICD-10-CM

## 2024-03-29 LAB
25(OH)D3 SERPL-MCNC: 28.9 NG/ML
ALBUMIN SERPL ELPH-MCNC: 4.2 G/DL
ALP BLD-CCNC: 76 U/L
ALT SERPL-CCNC: 12 U/L
ANION GAP SERPL CALC-SCNC: 10 MMOL/L
AST SERPL-CCNC: 17 U/L
BILIRUB SERPL-MCNC: 0.4 MG/DL
BUN SERPL-MCNC: 10 MG/DL
CALCIUM SERPL-MCNC: 9.7 MG/DL
CHLORIDE SERPL-SCNC: 105 MMOL/L
CHOLEST SERPL-MCNC: 179 MG/DL
CO2 SERPL-SCNC: 25 MMOL/L
CREAT SERPL-MCNC: 0.73 MG/DL
EGFR: 101 ML/MIN/1.73M2
GLUCOSE SERPL-MCNC: 95 MG/DL
HCT VFR BLD CALC: 39 %
HDLC SERPL-MCNC: 72 MG/DL
HGB BLD-MCNC: 12.7 G/DL
LDLC SERPL CALC-MCNC: 93 MG/DL
MCHC RBC-ENTMCNC: 30.8 PG
MCHC RBC-ENTMCNC: 32.6 GM/DL
MCV RBC AUTO: 94.4 FL
NONHDLC SERPL-MCNC: 107 MG/DL
PLATELET # BLD AUTO: 329 K/UL
POTASSIUM SERPL-SCNC: 4.2 MMOL/L
PROT SERPL-MCNC: 7.2 G/DL
RBC # BLD: 4.13 M/UL
RBC # FLD: 13.2 %
SODIUM SERPL-SCNC: 141 MMOL/L
TRIGL SERPL-MCNC: 79 MG/DL
TSH SERPL-ACNC: 1.92 UIU/ML
WBC # FLD AUTO: 5.13 K/UL

## 2024-03-29 PROCEDURE — 99214 OFFICE O/P EST MOD 30 MIN: CPT

## 2024-03-29 RX ORDER — ELVITEGRAVIR, COBICISTAT, EMTRICITABINE, AND TENOFOVIR ALAFENAMIDE 150; 150; 200; 10 MG/1; MG/1; MG/1; MG/1
150-150-200-10 TABLET ORAL
Qty: 30 | Refills: 5 | Status: ACTIVE | COMMUNITY
Start: 2017-06-13 | End: 1900-01-01

## 2024-03-29 RX ORDER — VALACYCLOVIR 500 MG/1
500 TABLET, FILM COATED ORAL
Qty: 30 | Refills: 5 | Status: ACTIVE | COMMUNITY
Start: 2017-09-22 | End: 1900-01-01

## 2024-03-29 RX ORDER — ADHESIVE TAPE 3"X 2.3 YD
50 MCG TAPE, NON-MEDICATED TOPICAL
Qty: 30 | Refills: 5 | Status: ACTIVE | COMMUNITY
Start: 2021-10-12 | End: 1900-01-01

## 2024-03-29 RX ORDER — SODIUM SULFATE, POTASSIUM SULFATE AND MAGNESIUM SULFATE 1.6; 3.13; 17.5 G/177ML; G/177ML; G/177ML
17.5-3.13-1.6 SOLUTION ORAL
Qty: 1 | Refills: 0 | Status: COMPLETED | COMMUNITY
Start: 2023-12-19 | End: 2024-03-29

## 2024-03-29 NOTE — HISTORY OF PRESENT ILLNESS
[FreeTextEntry1] : Reason For Visit---HIV management  JUSTUS LYNN is a 48 year old female being seen for a HIV follow-up visit. Doing well. On Genvoya and Valtrex and Vit D3. Doing well. Continued joint pains and numbness of hands and feet now resolved with going to the gym .Diagnosed HIV in . Needs to follow up with both neuro and rheum. up to date with gyn and mammo for  Had pfizer both doses same date as  non drinker , non smoker father had colon cancer, has upcoming colonoscopy   Plan 3/29/24: 1)  continue Genvoya [phone] one tab daily and daily for HIV management, Valtrex 500mg tablet oral daily and continue Vit D3 2000iu oral daily--all meds renewed today  2) needs GI ( father had colon ca) scheduled for . 3) reviewed all labs and new labs today including CBC, BMP, urine, CD4 and viral load, tsh, a1C 4) see in 6 months      History of Present Illness  42 year old female. English and Stateless speaking. Diagnosed 24 tears ago. Contracted HIV from her first  who  from HIV in .  second  in  and transmitted hiv to her second . Was originally treated for HIV at [facility] in [place], then was at [facility] with Dr Tracie Euceda, Decided to change over. Taking Genvoya for past 7 months started 2016. Doing well. Takes only Genvoya and valtrex for herpetic outbreaks. Recently states seen in ER for herniated cervical disc, pain to left arm.  medical hx: HIV, was Hep C positive positive treated in , was treated for hep C first 17 years ago. Hep C now resolved. Surgery- only eye surgery.  Both parent alive- father vision and hearing. Mother 75 year old.-Thyroid, deminia, high cholesterol, osteoposis, parkinsons.      Sexual History: The patient is sexually active. The patient is for some encounters. She is currently sexually active with male partner(s).  STI, Dates, Treatment: herpes, vatrex.  Travel: yes.  Occupation: no, not at present.  Lives with .  Who is aware of HIV status: yes.    Social History  no drinking or smoking    Current Meds  Genvoya and valtrex ( 500mg daily) supressive.      Active Problems  Flu vaccine need (V04.81) (Z23)  Folliculitis (704.8) (L73.9)  Headache (784.0) (R51)  Herpes, genital (054.10) (A60.00)  HIV disease (042) (B20)  Joint pain (719.40) (M25.50)  Missed menses (626.4) (N92.6)  Other herniation of intervertebral disc of cervical spine (722.0) (M50.20)  Polyarthritis (716.50) (M13.0)  Seasonal allergic rhinitis, unspecified chronicity, unspecified trigger  Sore throat (462) (J02.9)  Stress incontinence, female (625.6) (N39.3)  Varicose veins of both lower extremities with pain (454.8) (I83.813)    Past Medical History  History of hepatitis C virus infection (V12.09) (Z86.19)  History of Visit for dental examination (V72.2) (Z01.20)  History of Visit for eye and vision exam (V72.0) (Z01.00)  History of Visit for gynecologic examination (V72.31) (Z01.419)    Current Meds  pt stopped Gabapentin 100 MG Oral Capsule; 1 tablet at bed time, if no side effects every 3 days can  increase bytablets until 300 mg  Genvoya [phone] MG Oral Tablet; TAKE ONE TABLET BY MOUTH ONCE A DAY  valACYclovir HCl - 500 MG Oral Tablet; TAKE ONE TABLET BY MOUTH ONCE A DAY  Vitamin D High Potency 25 MCG (1000 UT) Oral Capsule; TAKE ONE CAPSULE BY  MOUTH ONCE A DAY  Vitamin D3 25 MCG (1000 UT) Oral Tablet; TAKE 1 TABLET DAILY    Allergies  No Known Drug Allergies  Fish  Shellfish

## 2024-03-29 NOTE — ASSESSMENT
[FreeTextEntry1] : Plan 3/29/24: 1)  continue Genvoya [phone] one tab daily and daily for HIV management, Valtrex 500mg tablet oral daily and continue Vit D3 2000iu oral daily--all meds renewed today  2) needs GI ( father had colon ca) scheduled for 2024. 3) reviewed all labs and new labs today including CBC, BMP, urine, CD4 and viral load, tsh, a1C 4) see in 6 months

## 2024-04-01 ENCOUNTER — NON-APPOINTMENT (OUTPATIENT)
Age: 49
End: 2024-04-01

## 2024-04-01 LAB
APPEARANCE: CLEAR
BACTERIA: NEGATIVE /HPF
BILIRUBIN URINE: NEGATIVE
BLOOD URINE: NEGATIVE
CAST: 0 /LPF
CD3 CELLS # BLD: 844 CELLS/UL
CD3 CELLS NFR BLD: 67 %
CD3+CD4+ CELLS # BLD: 447 CELLS/UL
CD3+CD4+ CELLS NFR BLD: 35 %
CD3+CD4+ CELLS/CD3+CD8+ CLL SPEC: 1.16 RATIO
CD3+CD8+ CELLS # SPEC: 385 CELLS/UL
CD3+CD8+ CELLS NFR BLD: 30 %
COLOR: YELLOW
EPITHELIAL CELLS: 0 /HPF
ESTIMATED AVERAGE GLUCOSE: 105 MG/DL
GLUCOSE QUALITATIVE U: NEGATIVE MG/DL
HBA1C MFR BLD HPLC: 5.3 %
HCV RNA SERPL NAA+PROBE-LOG IU: NOT DETECTED LOGIU/ML
HEPC RNA INTERP: NOT DETECTED
HIV1 RNA # SERPL NAA+PROBE: ABNORMAL
HIV1 RNA # SERPL NAA+PROBE: ABNORMAL COPIES/ML
KETONES URINE: NEGATIVE MG/DL
LEUKOCYTE ESTERASE URINE: NEGATIVE
MICROSCOPIC-UA: NORMAL
NITRITE URINE: NEGATIVE
PH URINE: 8
PROTEIN URINE: NEGATIVE MG/DL
RED BLOOD CELLS URINE: 0 /HPF
SPECIFIC GRAVITY URINE: 1.01
T PALLIDUM AB SER QL IA: NEGATIVE
UROBILINOGEN URINE: 0.2 MG/DL
VIRAL LOAD INTERP: NORMAL
VIRAL LOAD LOG: ABNORMAL LG COP/ML
WHITE BLOOD CELLS URINE: 0 /HPF

## 2024-04-02 ENCOUNTER — APPOINTMENT (OUTPATIENT)
Dept: GASTROENTEROLOGY | Facility: AMBULATORY MEDICAL SERVICES | Age: 49
End: 2024-04-02

## 2024-08-14 ENCOUNTER — NON-APPOINTMENT (OUTPATIENT)
Age: 49
End: 2024-08-14

## 2024-10-22 ENCOUNTER — LABORATORY RESULT (OUTPATIENT)
Age: 49
End: 2024-10-22

## 2024-10-22 ENCOUNTER — APPOINTMENT (OUTPATIENT)
Dept: INFECTIOUS DISEASE | Facility: CLINIC | Age: 49
End: 2024-10-22
Payer: MEDICAID

## 2024-10-22 ENCOUNTER — NON-APPOINTMENT (OUTPATIENT)
Age: 49
End: 2024-10-22

## 2024-10-22 VITALS
BODY MASS INDEX: 25.1 KG/M2 | OXYGEN SATURATION: 98 % | HEIGHT: 64 IN | DIASTOLIC BLOOD PRESSURE: 69 MMHG | SYSTOLIC BLOOD PRESSURE: 100 MMHG | TEMPERATURE: 97.8 F | HEART RATE: 81 BPM | WEIGHT: 147 LBS

## 2024-10-22 DIAGNOSIS — Z92.89 PERSONAL HISTORY OF OTHER MEDICAL TREATMENT: ICD-10-CM

## 2024-10-22 DIAGNOSIS — Z28.21 IMMUNIZATION NOT CARRIED OUT BECAUSE OF PATIENT REFUSAL: ICD-10-CM

## 2024-10-22 DIAGNOSIS — L29.0 PRURITUS ANI: ICD-10-CM

## 2024-10-22 DIAGNOSIS — L85.3 XEROSIS CUTIS: ICD-10-CM

## 2024-10-22 LAB
25(OH)D3 SERPL-MCNC: 31.1 NG/ML
ALBUMIN SERPL ELPH-MCNC: 4.2 G/DL
ALP BLD-CCNC: 81 U/L
ALT SERPL-CCNC: 18 U/L
ANION GAP SERPL CALC-SCNC: 10 MMOL/L
AST SERPL-CCNC: 19 U/L
BILIRUB SERPL-MCNC: 0.3 MG/DL
BUN SERPL-MCNC: 14 MG/DL
CALCIUM SERPL-MCNC: 9.2 MG/DL
CD3 CELLS # BLD: 1086 CELLS/UL
CD3 CELLS NFR BLD: 70 %
CD3+CD4+ CELLS # BLD: 589 CELLS/UL
CD3+CD4+ CELLS NFR BLD: 38 %
CD3+CD4+ CELLS/CD3+CD8+ CLL SPEC: 1.22 RATIO
CD3+CD8+ CELLS # SPEC: 484 CELLS/UL
CD3+CD8+ CELLS NFR BLD: 31 %
CHLORIDE SERPL-SCNC: 104 MMOL/L
CHOLEST SERPL-MCNC: 188 MG/DL
CO2 SERPL-SCNC: 25 MMOL/L
CREAT SERPL-MCNC: 0.68 MG/DL
EGFR: 107 ML/MIN/1.73M2
GLUCOSE SERPL-MCNC: 88 MG/DL
HCT VFR BLD CALC: 38 %
HDLC SERPL-MCNC: 76 MG/DL
HGB BLD-MCNC: 12.6 G/DL
LDLC SERPL CALC-MCNC: 99 MG/DL
MCHC RBC-ENTMCNC: 31 PG
MCHC RBC-ENTMCNC: 33.2 GM/DL
MCV RBC AUTO: 93.6 FL
NONHDLC SERPL-MCNC: 112 MG/DL
PLATELET # BLD AUTO: 321 K/UL
POTASSIUM SERPL-SCNC: 4 MMOL/L
PROT SERPL-MCNC: 7.3 G/DL
RBC # BLD: 4.06 M/UL
RBC # FLD: 13.5 %
SODIUM SERPL-SCNC: 139 MMOL/L
TRIGL SERPL-MCNC: 69 MG/DL
TSH SERPL-ACNC: 2.42 UIU/ML
WBC # FLD AUTO: 5.89 K/UL

## 2024-10-22 PROCEDURE — 90715 TDAP VACCINE 7 YRS/> IM: CPT

## 2024-10-22 PROCEDURE — 99215 OFFICE O/P EST HI 40 MIN: CPT | Mod: 25

## 2024-10-22 PROCEDURE — 90471 IMMUNIZATION ADMIN: CPT

## 2024-10-22 RX ORDER — BIOTIN 10 MG
10000 TABLET ORAL DAILY
Qty: 90 | Refills: 2 | Status: ACTIVE | COMMUNITY
Start: 2024-10-22 | End: 1900-01-01

## 2024-10-23 PROBLEM — L85.3 DRY SKIN: Status: ACTIVE | Noted: 2024-10-23

## 2024-10-23 PROBLEM — Z28.21 INFLUENZA VACCINATION DECLINED: Status: ACTIVE | Noted: 2024-10-23

## 2024-10-23 PROBLEM — L29.0 ANAL ITCHING: Status: ACTIVE | Noted: 2024-10-23

## 2024-10-23 LAB
APPEARANCE: CLEAR
BILIRUBIN URINE: NEGATIVE
BLOOD URINE: NEGATIVE
COLOR: YELLOW
ESTIMATED AVERAGE GLUCOSE: 103 MG/DL
GLUCOSE QUALITATIVE U: NEGATIVE MG/DL
HBA1C MFR BLD HPLC: 5.2 %
HCV AB SER QL: REACTIVE
HCV RNA SERPL NAA+PROBE-LOG IU: NOT DETECTED LOGIU/ML
HCV S/CO RATIO: 11.79 S/CO
HEPB DNA PCR INT: NOT DETECTED
HEPB DNA PCR LOG: NOT DETECTED LOGIU/ML
HEPC RNA INTERP: NOT DETECTED
HIV1 RNA # SERPL NAA+PROBE: NORMAL
HIV1 RNA # SERPL NAA+PROBE: NORMAL COPIES/ML
KETONES URINE: NEGATIVE MG/DL
LEUKOCYTE ESTERASE URINE: ABNORMAL
MEV IGG FLD QL IA: 280 AU/ML
MEV IGG+IGM SER-IMP: POSITIVE
MUV AB SER-ACNC: POSITIVE
MUV IGG SER QL IA: 14.1 AU/ML
NITRITE URINE: NEGATIVE
PH URINE: 7
PROTEIN URINE: NEGATIVE MG/DL
RUBV IGG FLD-ACNC: >33 INDEX
RUBV IGG SER-IMP: POSITIVE
SPECIFIC GRAVITY URINE: 1.02
T PALLIDUM AB SER QL IA: NEGATIVE
UROBILINOGEN URINE: 0.2 MG/DL
VIRAL LOAD INTERP: NORMAL
VIRAL LOAD LOG: NORMAL LG COP/ML
VZV AB TITR SER: POSITIVE
VZV IGG SER IF-ACNC: 11.5 S/CO

## 2024-10-23 NOTE — HISTORY OF PRESENT ILLNESS
[FreeTextEntry1] : 48 yo female here for HIV annual consult  taking Genvoya daily with no missed doses or SE  noted with hair loss and decreased texture  started recently and is stabilizing  also noted with dry skin  started 2 years ago and  denies taking any medications for this.  was taking OTC collogen for dry skin with no result  Screenings : Colonoscopy 10/2024- polyps removed - return 5 years .  last ophthalmology .  Needs dental consult.  Last pap 2024- unremarkable.  Last tyler 2024- unremarkable.    Sexual hx : Heterosexual.  Currently sexually active with spouse only    From previous consult 3/29/2024 :  Reason For Visit---HIV management DELONTE ESPINOZA is a 48 year old female being seen for a HIV follow-up visit. Doing well. On Genvoya and Valtrex and Vit D3. Doing well. Continued joint pains and numbness of hands and feet now resolved with going to the gym .Diagnosed HIV in . Needs to follow up with both neuro and rheum. up to date with gyn and mammo for  Had pfizer both doses same date as  non drinker , non smoker father had colon cancer, has upcoming colonoscopy  History of Present Illness  42 year old female. English and Mongolian speaking. Diagnosed 24 tears ago. Contracted HIV from her first  who  from HIV in .  second  in  and transmitted hiv to her second . Was originally treated for HIV at [facility] in [place], then was at [facility] with Dr Yanet Overton, Decided to change over. Taking Genvoya for past 7 months started 2016. Doing well. Takes only Genvoya and valtrex for herpetic outbreaks. Recently states seen in ER for herniated cervical disc, pain to left arm.  medical hx: HIV, was Hep C positive positive treated in , was treated for hep C first 17 years ago. Hep C now resolved. Surgery- only eye surgery.  Both parent alive- father vision and hearing. Mother 75 year old.-Thyroid, deminia, high cholesterol, osteoposis, parkinsons.  Sexual History: The patient is sexually active. The patient is for some encounters. She is currently sexually active with male partner(s).  STI, Dates, Treatment: herpes, vatrex.  Travel: yes.  Occupation: no, not at present.  Lives with .  Who is aware of HIV status: yes.    10/22/2024 Plan  HIV  all test results from previous consult reviewed with patient.  1. continue current treatment - refills given  2. do blood work  3. f/u one week via telemed 4. Tdap vaccine given today  5. pt to bring records of Pneumococcal vaccine  6. dental referral given today   Declined Influenza vaccine  1. Pt declined Influenza vaccine   Anal Itching  pt noted with intermittent anal itching.  pt f/u with GI and was given creams with good but temporary results  1. f/u with GI for further management   Hair thinning/ dry skin  1. start Biotin one tab daily  2. if ineffective- referral to dermatology discussed

## 2024-10-23 NOTE — ASSESSMENT
[Treatment Education] : treatment education [Treatment Adherence] : treatment adherence [Rx Dose / Side Effects] : Rx dose/side effects [Medical Care Issues] : medical care issues [Drug Interactions / Side Effects] : drug interactions/side effects [FreeTextEntry1] : HIV f/u consult.  HIV stable   10/22/2024 Plan  HIV  all test results from previous consult reviewed with patient.  1. continue current treatment - refills given  2. do blood work  3. f/u one week via telemed 4. Tdap vaccine given today  5. pt to bring records of Pneumococcal vaccine  6. dental referral given today   Declined Influenza vaccine  1. Pt declined Influenza vaccine   Anal Itching  pt noted with intermittent anal itching.  pt f/u with GI and was given creams with good but temporary results  1. f/u with GI for further management   Hair thinning/ dry skin  1. start Biotin one tab daily  2. if ineffective- referral to dermatology discussed

## 2024-10-23 NOTE — HISTORY OF PRESENT ILLNESS
[FreeTextEntry1] : 48 yo female here for HIV annual consult  taking Genvoya daily with no missed doses or SE  noted with hair loss and decreased texture  started recently and is stabilizing  also noted with dry skin  started 2 years ago and  denies taking any medications for this.  was taking OTC collogen for dry skin with no result  Screenings : Colonoscopy 10/2024- polyps removed - return 5 years .  last ophthalmology .  Needs dental consult.  Last pap 2024- unremarkable.  Last tyler 2024- unremarkable.    Sexual hx : Heterosexual.  Currently sexually active with spouse only    From previous consult 3/29/2024 :  Reason For Visit---HIV management DELONTE ESPINOZA is a 48 year old female being seen for a HIV follow-up visit. Doing well. On Genvoya and Valtrex and Vit D3. Doing well. Continued joint pains and numbness of hands and feet now resolved with going to the gym .Diagnosed HIV in . Needs to follow up with both neuro and rheum. up to date with gyn and mammo for  Had pfizer both doses same date as  non drinker , non smoker father had colon cancer, has upcoming colonoscopy  History of Present Illness  42 year old female. English and Azeri speaking. Diagnosed 24 tears ago. Contracted HIV from her first  who  from HIV in .  second  in  and transmitted hiv to her second . Was originally treated for HIV at [facility] in [place], then was at [facility] with Dr Yanet Overton, Decided to change over. Taking Genvoya for past 7 months started 2016. Doing well. Takes only Genvoya and valtrex for herpetic outbreaks. Recently states seen in ER for herniated cervical disc, pain to left arm.  medical hx: HIV, was Hep C positive positive treated in , was treated for hep C first 17 years ago. Hep C now resolved. Surgery- only eye surgery.  Both parent alive- father vision and hearing. Mother 75 year old.-Thyroid, deminia, high cholesterol, osteoposis, parkinsons.  Sexual History: The patient is sexually active. The patient is for some encounters. She is currently sexually active with male partner(s).  STI, Dates, Treatment: herpes, vatrex.  Travel: yes.  Occupation: no, not at present.  Lives with .  Who is aware of HIV status: yes.    10/22/2024 Plan  HIV  all test results from previous consult reviewed with patient.  1. continue current treatment - refills given  2. do blood work  3. f/u one week via telemed 4. Tdap vaccine given today  5. pt to bring records of Pneumococcal vaccine  6. dental referral given today   Declined Influenza vaccine  1. Pt declined Influenza vaccine   Anal Itching  pt noted with intermittent anal itching.  pt f/u with GI and was given creams with good but temporary results  1. f/u with GI for further management   Hair thinning/ dry skin  1. start Biotin one tab daily  2. if ineffective- referral to dermatology discussed

## 2024-10-23 NOTE — PHYSICAL EXAM
[General Appearance - Alert] : alert [General Appearance - In No Acute Distress] : in no acute distress [General Appearance - Well Nourished] : well nourished [General Appearance - Well-Appearing] : healthy appearing [Sclera] : the sclera and conjunctiva were normal [PERRL With Normal Accommodation] : pupils were equal in size, round, reactive to light [Extraocular Movements] : extraocular movements were intact [Outer Ear] : the ears and nose were normal in appearance [Hearing Threshold Finger Rub Not [place]] : hearing was normal [Examination Of The Oral Cavity] : the lips and gums were normal [Both Tympanic Membranes Were Examined] : both tympanic membranes were normal [Oropharynx] : the oropharynx was normal with no thrush [Neck Appearance] : the appearance of the neck was normal [Neck Cervical Mass (___cm)] : no neck mass was observed [Jugular Venous Distention Increased] : there was no jugular-venous distention [Thyroid Diffuse Enlargement] : the thyroid was not enlarged [Respiration, Rhythm And Depth] : normal respiratory rhythm and effort [Exaggerated Use Of Accessory Muscles For Inspiration] : no accessory muscle use [Auscultation Breath Sounds / Voice Sounds] : lungs were clear to auscultation bilaterally [Heart Rate And Rhythm] : heart rate was normal and rhythm regular [Heart Sounds] : normal S1 and S2 [Heart Sounds Gallop] : no gallops [Murmurs] : no murmurs [Heart Sounds Pericardial Friction Rub] : no pericardial rub [Edema] : there was no peripheral edema [Bowel Sounds] : normal bowel sounds [Abdomen Soft] : soft [Abdomen Tenderness] : non-tender [Costovertebral Angle Tenderness] : no CVA tenderness [No Palpable Adenopathy] : no palpable adenopathy [Musculoskeletal - Swelling] : no joint swelling [Range of Motion to Joints] : range of motion to joints [Nail Clubbing] : no clubbing  or cyanosis of the fingernails [Motor Tone] : muscle strength and tone were normal [Skin Color & Pigmentation] : normal skin color and pigmentation [] : no rash [Skin Lesions] : no skin lesions [Cranial Nerves] : cranial nerves 2-12 were intact [Sensation] : the sensory exam was normal to light touch and pinprick [Motor Exam] : the motor exam was normal [No Focal Deficits] : no focal deficits [Oriented To Time, Place, And Person] : oriented to person, place, and time [Affect] : the affect was normal

## 2024-10-23 NOTE — REVIEW OF SYSTEMS
[Negative] : Heme/Lymph [FreeTextEntry7] : anal itching - under GI care was given creams [de-identified] : dry skin and hair thinning and decreased texture

## 2024-10-23 NOTE — REVIEW OF SYSTEMS
[Negative] : Heme/Lymph [FreeTextEntry7] : anal itching - under GI care was given creams [de-identified] : dry skin and hair thinning and decreased texture

## 2024-10-23 NOTE — PHYSICAL EXAM
[General Appearance - Alert] : alert [General Appearance - In No Acute Distress] : in no acute distress [General Appearance - Well Nourished] : well nourished [General Appearance - Well-Appearing] : healthy appearing [Sclera] : the sclera and conjunctiva were normal [PERRL With Normal Accommodation] : pupils were equal in size, round, reactive to light [Extraocular Movements] : extraocular movements were intact [Outer Ear] : the ears and nose were normal in appearance [Hearing Threshold Finger Rub Not [place]] : hearing was normal [Examination Of The Oral Cavity] : the lips and gums were normal [Both Tympanic Membranes Were Examined] : both tympanic membranes were normal [Oropharynx] : the oropharynx was normal with no thrush [Neck Appearance] : the appearance of the neck was normal [Neck Cervical Mass (___cm)] : no neck mass was observed [Jugular Venous Distention Increased] : there was no jugular-venous distention [Thyroid Diffuse Enlargement] : the thyroid was not enlarged [Respiration, Rhythm And Depth] : normal respiratory rhythm and effort [Exaggerated Use Of Accessory Muscles For Inspiration] : no accessory muscle use [Auscultation Breath Sounds / Voice Sounds] : lungs were clear to auscultation bilaterally [Heart Rate And Rhythm] : heart rate was normal and rhythm regular [Heart Sounds] : normal S1 and S2 [Heart Sounds Gallop] : no gallops [Murmurs] : no murmurs [Heart Sounds Pericardial Friction Rub] : no pericardial rub [Edema] : there was no peripheral edema [Bowel Sounds] : normal bowel sounds [Abdomen Soft] : soft [Abdomen Tenderness] : non-tender [Costovertebral Angle Tenderness] : no CVA tenderness [No Palpable Adenopathy] : no palpable adenopathy [Musculoskeletal - Swelling] : no joint swelling [Range of Motion to Joints] : range of motion to joints [Nail Clubbing] : no clubbing  or cyanosis of the fingernails [Motor Tone] : muscle strength and tone were normal [Skin Color & Pigmentation] : normal skin color and pigmentation [] : no rash [Skin Lesions] : no skin lesions [Cranial Nerves] : cranial nerves 2-12 were intact [Sensation] : the sensory exam was normal to light touch and pinprick [Motor Exam] : the motor exam was normal [No Focal Deficits] : no focal deficits [Oriented To Time, Place, And Person] : oriented to person, place, and time [Affect] : the affect was normal

## 2024-10-25 LAB — HLX HLA B57-01 ANTIGEN FINAL REPORT: NORMAL

## 2024-10-25 RX ORDER — NITROFURANTOIN (MONOHYDRATE/MACROCRYSTALS) 25; 75 MG/1; MG/1
100 CAPSULE ORAL
Qty: 10 | Refills: 0 | Status: ACTIVE | COMMUNITY
Start: 2024-10-25 | End: 1900-01-01

## 2024-10-29 LAB
M TB IFN-G BLD-IMP: NEGATIVE
QUANTIFERON TB PLUS MITOGEN MINUS NIL: >10 IU/ML
QUANTIFERON TB PLUS NIL: 4.19 IU/ML
QUANTIFERON TB PLUS TB1 MINUS NIL: 0.61 IU/ML
QUANTIFERON TB PLUS TB2 MINUS NIL: 0.4 IU/ML

## 2024-10-30 ENCOUNTER — APPOINTMENT (OUTPATIENT)
Dept: INFECTIOUS DISEASE | Facility: CLINIC | Age: 49
End: 2024-10-30
Payer: MEDICAID

## 2024-10-30 ENCOUNTER — NON-APPOINTMENT (OUTPATIENT)
Age: 49
End: 2024-10-30

## 2024-10-30 DIAGNOSIS — N30.00 ACUTE CYSTITIS W/OUT HEMATURIA: ICD-10-CM

## 2024-10-30 DIAGNOSIS — L65.9 NONSCARRING HAIR LOSS, UNSPECIFIED: ICD-10-CM

## 2024-10-30 DIAGNOSIS — B20 HUMAN IMMUNODEFICIENCY VIRUS [HIV] DISEASE: ICD-10-CM

## 2024-10-30 PROCEDURE — 99442: CPT | Mod: 93

## 2024-10-31 ENCOUNTER — NON-APPOINTMENT (OUTPATIENT)
Age: 49
End: 2024-10-31

## 2025-01-08 NOTE — ED ADULT NURSE NOTE - PRIMARY CARE PROVIDER
Dear Wandy Foster,  The recent labs were sent to us.  The white blood cell 7.9, hemoglobin 13.4, MCV 89 and platelets 205.  The eosinophils are slightly elevated and this could be due to your allergies if you have them.  Please share with your primary care.  Alkaline phosphatase 335, AST 26 and ALT 28.  IgM 843 IgG 683. Prior igm was lower. We will monitor this. The labs have improved though as prior the alkaline phosphatase was 489 and the ast 48 and alt 62. Happy to see this! Happy New Year.  Sarah Cruz PA-C
unknown

## 2025-04-08 ENCOUNTER — APPOINTMENT (OUTPATIENT)
Age: 50
End: 2025-04-08

## 2025-04-08 PROCEDURE — D0330 PANORAMIC RADIOGRAPHIC IMAGE: CPT

## 2025-04-29 ENCOUNTER — APPOINTMENT (OUTPATIENT)
Dept: INFECTIOUS DISEASE | Facility: CLINIC | Age: 50
End: 2025-04-29
Payer: MEDICAID

## 2025-04-29 ENCOUNTER — NON-APPOINTMENT (OUTPATIENT)
Age: 50
End: 2025-04-29

## 2025-04-29 VITALS
HEIGHT: 64 IN | TEMPERATURE: 98.5 F | HEART RATE: 70 BPM | WEIGHT: 150 LBS | OXYGEN SATURATION: 99 % | SYSTOLIC BLOOD PRESSURE: 93 MMHG | DIASTOLIC BLOOD PRESSURE: 65 MMHG | BODY MASS INDEX: 25.61 KG/M2

## 2025-04-29 DIAGNOSIS — Z78.9 OTHER SPECIFIED HEALTH STATUS: ICD-10-CM

## 2025-04-29 DIAGNOSIS — R53.83 OTHER FATIGUE: ICD-10-CM

## 2025-04-29 DIAGNOSIS — R21 RASH AND OTHER NONSPECIFIC SKIN ERUPTION: ICD-10-CM

## 2025-04-29 DIAGNOSIS — B20 HUMAN IMMUNODEFICIENCY VIRUS [HIV] DISEASE: ICD-10-CM

## 2025-04-29 DIAGNOSIS — L65.9 NONSCARRING HAIR LOSS, UNSPECIFIED: ICD-10-CM

## 2025-04-29 PROCEDURE — 99215 OFFICE O/P EST HI 40 MIN: CPT | Mod: 25

## 2025-04-29 PROCEDURE — 90684 PCV21 VACCINE IM: CPT

## 2025-04-29 PROCEDURE — G0009: CPT

## 2025-04-29 RX ORDER — MULTIVITAMIN
TABLET ORAL
Qty: 90 | Refills: 2 | Status: ACTIVE | COMMUNITY
Start: 2025-04-29 | End: 1900-01-01

## 2025-04-29 RX ORDER — HYDROCORTISONE 25 MG/G
2.5 CREAM TOPICAL TWICE DAILY
Qty: 1 | Refills: 6 | Status: ACTIVE | COMMUNITY
Start: 2025-04-29 | End: 1900-01-01

## 2025-04-29 NOTE — REVIEW OF SYSTEMS
[Negative] : Heme/Lymph [As Noted in HPI] : as noted in HPI [Feeling Tired] : feeling tired [de-identified] : thinning hair, intermittent rash on gluteal area

## 2025-04-29 NOTE — HISTORY OF PRESENT ILLNESS
[FreeTextEntry1] : 48 yo female here for HIV f/u consult in the company of spouse taking Genvoya daily with no missed doses or SE  noted with increased fatigue since 2 weeks ago  she confirms adequate sleep at night 8 hours denies any changes in medications   pt f/u with dermatology for hair loss was started on Biotin 3 months ago and has not noticed a difference.   does not have f/u with dermatology scheduled yet.    Pt noted with mild intermittent rash on gluteal area has noted with irritation with sitting on leather seats in the car  went to dermatology and was given hydrocortisone cream with good result  currently with change in weather - the rash returned   Sexual hx : Heterosexual.  Currently sexually active with spouse only.      From previous consult 10/22/2024 :  48 yo female here for HIV annual consult taking Genvoya daily with no missed doses or SE  noted with hair loss and decreased texture started recently and is stabilizing also noted with dry skin started 2 years ago and denies taking any medications for this. was taking OTC collogen for dry skin with no result  Screenings : Colonoscopy 10/2024- polyps removed - return 5 years 2029. last ophthalmology 2023. Needs dental consult. Last pap 5/2024- unremarkable. Last tyler 5/2024- unremarkable.  Sexual hx : Heterosexual. Currently sexually active with spouse only   4/28/2025 Plan HIV all test results from previous consult reviewed with patient. 1. continue current treatment - refills given 2. do blood work 3. f/u 6 months via telemed 4. Pneumococcal 21 vaccine given today  Hair thinning pt f/u with dermatology for hair loss and started on Biotin 3 months ago.  No results noted.   1. reassured pt that time is needed to appreciate effects of Biotin treatment - continue for now  2. reassess in 6 months and if no improvement noted - return to Dermatology for further management   Rash  pt noted with rash in gluteal area as result of excessive moisture from sitting on leather seats.  Was given hydrocortisone cream in past with good result  1. restart Hydrocortisone cream BID PRN  Fatigue  noted with fatigue for the past 2 weeks  1. start MVI one tab daily 6-8 hours apart from ARV regimen

## 2025-04-29 NOTE — PHYSICAL EXAM
[General Appearance - Alert] : alert [General Appearance - In No Acute Distress] : in no acute distress [General Appearance - Well Nourished] : well nourished [General Appearance - Well-Appearing] : healthy appearing [Sclera] : the sclera and conjunctiva were normal [PERRL With Normal Accommodation] : pupils were equal in size, round, reactive to light [Extraocular Movements] : extraocular movements were intact [Outer Ear] : the ears and nose were normal in appearance [Hearing Threshold Finger Rub Not [place]] : hearing was normal [Examination Of The Oral Cavity] : the lips and gums were normal [Both Tympanic Membranes Were Examined] : both tympanic membranes were normal [Oropharynx] : the oropharynx was normal with no thrush [Neck Appearance] : the appearance of the neck was normal [Neck Cervical Mass (___cm)] : no neck mass was observed [Jugular Venous Distention Increased] : there was no jugular-venous distention [Thyroid Diffuse Enlargement] : the thyroid was not enlarged [Respiration, Rhythm And Depth] : normal respiratory rhythm and effort [Exaggerated Use Of Accessory Muscles For Inspiration] : no accessory muscle use [Auscultation Breath Sounds / Voice Sounds] : lungs were clear to auscultation bilaterally [Heart Rate And Rhythm] : heart rate was normal and rhythm regular [Heart Sounds] : normal S1 and S2 [Heart Sounds Gallop] : no gallops [Murmurs] : no murmurs [Heart Sounds Pericardial Friction Rub] : no pericardial rub [Edema] : there was no peripheral edema [Bowel Sounds] : normal bowel sounds [Abdomen Soft] : soft [Abdomen Tenderness] : non-tender [Costovertebral Angle Tenderness] : no CVA tenderness [No Palpable Adenopathy] : no palpable adenopathy [Musculoskeletal - Swelling] : no joint swelling [Range of Motion to Joints] : range of motion to joints [Nail Clubbing] : no clubbing  or cyanosis of the fingernails [Motor Tone] : muscle strength and tone were normal [Skin Color & Pigmentation] : normal skin color and pigmentation [] : no rash [Skin Lesions] : no skin lesions [Cranial Nerves] : cranial nerves 2-12 were intact [Sensation] : the sensory exam was normal to light touch and pinprick [Motor Exam] : the motor exam was normal [No Focal Deficits] : no focal deficits [Oriented To Time, Place, And Person] : oriented to person, place, and time [Affect] : the affect was normal

## 2025-04-29 NOTE — ASSESSMENT
[Treatment Education] : treatment education [Treatment Adherence] : treatment adherence [Rx Dose / Side Effects] : Rx dose/side effects [Medical Care Issues] : medical care issues [Drug Interactions / Side Effects] : drug interactions/side effects [FreeTextEntry1] : HIV f/u consult.  HIV stable   4/28/2025 Plan HIV all test results from previous consult reviewed with patient. 1. continue current treatment - refills given 2. do blood work 3. f/u 6 months via telemed 4. Pneumococcal 21 vaccine given today  Hair thinning pt f/u with dermatology for hair loss and started on Biotin 3 months ago.  No results noted.   1. reassured pt that time is needed to appreciate effects of Biotin treatment - continue for now  2. reassess in 6 months and if no improvement noted - return to Dermatology for further management   Rash  pt noted with rash in gluteal area as result of excessive moisture from sitting on leather seats.  Was given hydrocortisone cream in past with good result  1. restart Hydrocortisone cream BID PRN  Fatigue  noted with fatigue for the past 2 weeks  1. start MVI one tab daily 6-8 hours apart from ARV regimen

## 2025-04-30 LAB
25(OH)D3 SERPL-MCNC: 25.8 NG/ML
ALBUMIN SERPL ELPH-MCNC: 3.9 G/DL
ALP BLD-CCNC: 81 U/L
ALT SERPL-CCNC: 31 U/L
ANION GAP SERPL CALC-SCNC: 11 MMOL/L
AST SERPL-CCNC: 23 U/L
BILIRUB SERPL-MCNC: 0.2 MG/DL
BUN SERPL-MCNC: 13 MG/DL
CALCIUM SERPL-MCNC: 9.1 MG/DL
CD3 CELLS # BLD: 978 CELLS/UL
CD3 CELLS NFR BLD: 69 %
CD3+CD4+ CELLS # BLD: 524 CELLS/UL
CD3+CD4+ CELLS NFR BLD: 37 %
CD3+CD4+ CELLS/CD3+CD8+ CLL SPEC: 1.2 RATIO
CD3+CD8+ CELLS # SPEC: 436 CELLS/UL
CD3+CD8+ CELLS NFR BLD: 31 %
CHLORIDE SERPL-SCNC: 107 MMOL/L
CO2 SERPL-SCNC: 23 MMOL/L
CREAT SERPL-MCNC: 0.67 MG/DL
EGFRCR SERPLBLD CKD-EPI 2021: 107 ML/MIN/1.73M2
GLUCOSE SERPL-MCNC: 86 MG/DL
HCT VFR BLD CALC: 36.9 %
HGB BLD-MCNC: 12.3 G/DL
HIV1 RNA # SERPL NAA+PROBE: NORMAL
HIV1 RNA # SERPL NAA+PROBE: NORMAL COPIES/ML
MCHC RBC-ENTMCNC: 31.5 PG
MCHC RBC-ENTMCNC: 33.3 G/DL
MCV RBC AUTO: 94.6 FL
PLATELET # BLD AUTO: 317 K/UL
POTASSIUM SERPL-SCNC: 4.5 MMOL/L
PROT SERPL-MCNC: 6.7 G/DL
RBC # BLD: 3.9 M/UL
RBC # FLD: 13.3 %
SODIUM SERPL-SCNC: 142 MMOL/L
VIABILITY: NORMAL
VIRAL LOAD INTERP: NORMAL
VIRAL LOAD LOG: NORMAL LG COP/ML
WBC # FLD AUTO: 5.45 K/UL

## 2025-08-08 ENCOUNTER — NON-APPOINTMENT (OUTPATIENT)
Age: 50
End: 2025-08-08